# Patient Record
Sex: FEMALE | Race: WHITE
[De-identification: names, ages, dates, MRNs, and addresses within clinical notes are randomized per-mention and may not be internally consistent; named-entity substitution may affect disease eponyms.]

---

## 2018-02-20 ENCOUNTER — HOSPITAL ENCOUNTER (INPATIENT)
Dept: HOSPITAL 17 - NEPE | Age: 63
LOS: 8 days | Discharge: NURSING, REHAB OR LONG-TERM CARE | DRG: 29 | End: 2018-02-28
Attending: HOSPITALIST | Admitting: HOSPITALIST
Payer: COMMERCIAL

## 2018-02-20 VITALS — WEIGHT: 210.1 LBS | HEIGHT: 63 IN | BODY MASS INDEX: 37.23 KG/M2

## 2018-02-20 VITALS
OXYGEN SATURATION: 97 % | TEMPERATURE: 98 F | HEART RATE: 86 BPM | RESPIRATION RATE: 16 BRPM | DIASTOLIC BLOOD PRESSURE: 79 MMHG | SYSTOLIC BLOOD PRESSURE: 167 MMHG

## 2018-02-20 DIAGNOSIS — Z80.8: ICD-10-CM

## 2018-02-20 DIAGNOSIS — Z80.1: ICD-10-CM

## 2018-02-20 DIAGNOSIS — N39.498: ICD-10-CM

## 2018-02-20 DIAGNOSIS — Z82.49: ICD-10-CM

## 2018-02-20 DIAGNOSIS — G62.9: ICD-10-CM

## 2018-02-20 DIAGNOSIS — E66.9: ICD-10-CM

## 2018-02-20 DIAGNOSIS — G89.29: ICD-10-CM

## 2018-02-20 DIAGNOSIS — I10: ICD-10-CM

## 2018-02-20 DIAGNOSIS — G82.21: ICD-10-CM

## 2018-02-20 DIAGNOSIS — F17.210: ICD-10-CM

## 2018-02-20 DIAGNOSIS — K21.9: ICD-10-CM

## 2018-02-20 DIAGNOSIS — E11.65: ICD-10-CM

## 2018-02-20 DIAGNOSIS — Z23: ICD-10-CM

## 2018-02-20 DIAGNOSIS — Z91.81: ICD-10-CM

## 2018-02-20 DIAGNOSIS — M54.5: ICD-10-CM

## 2018-02-20 DIAGNOSIS — Z80.0: ICD-10-CM

## 2018-02-20 DIAGNOSIS — G95.20: ICD-10-CM

## 2018-02-20 DIAGNOSIS — J30.2: ICD-10-CM

## 2018-02-20 DIAGNOSIS — D35.02: ICD-10-CM

## 2018-02-20 DIAGNOSIS — D32.1: Primary | ICD-10-CM

## 2018-02-20 DIAGNOSIS — Z83.3: ICD-10-CM

## 2018-02-20 DIAGNOSIS — M19.90: ICD-10-CM

## 2018-02-20 PROCEDURE — 94150 VITAL CAPACITY TEST: CPT

## 2018-02-20 PROCEDURE — 72158 MRI LUMBAR SPINE W/O & W/DYE: CPT

## 2018-02-20 PROCEDURE — 72147 MRI CHEST SPINE W/DYE: CPT

## 2018-02-20 PROCEDURE — 80053 COMPREHEN METABOLIC PANEL: CPT

## 2018-02-20 PROCEDURE — 88307 TISSUE EXAM BY PATHOLOGIST: CPT

## 2018-02-20 PROCEDURE — 71260 CT THORAX DX C+: CPT

## 2018-02-20 PROCEDURE — 90686 IIV4 VACC NO PRSV 0.5 ML IM: CPT

## 2018-02-20 PROCEDURE — 72156 MRI NECK SPINE W/O & W/DYE: CPT

## 2018-02-20 PROCEDURE — 85027 COMPLETE CBC AUTOMATED: CPT

## 2018-02-20 PROCEDURE — 99285 EMERGENCY DEPT VISIT HI MDM: CPT

## 2018-02-20 PROCEDURE — 88311 DECALCIFY TISSUE: CPT

## 2018-02-20 PROCEDURE — 70450 CT HEAD/BRAIN W/O DYE: CPT

## 2018-02-20 PROCEDURE — 83735 ASSAY OF MAGNESIUM: CPT

## 2018-02-20 PROCEDURE — 86850 RBC ANTIBODY SCREEN: CPT

## 2018-02-20 PROCEDURE — 85025 COMPLETE CBC W/AUTO DIFF WBC: CPT

## 2018-02-20 PROCEDURE — 71045 X-RAY EXAM CHEST 1 VIEW: CPT

## 2018-02-20 PROCEDURE — 84484 ASSAY OF TROPONIN QUANT: CPT

## 2018-02-20 PROCEDURE — 93005 ELECTROCARDIOGRAM TRACING: CPT

## 2018-02-20 PROCEDURE — 86900 BLOOD TYPING SEROLOGIC ABO: CPT

## 2018-02-20 PROCEDURE — 86140 C-REACTIVE PROTEIN: CPT

## 2018-02-20 PROCEDURE — 82948 REAGENT STRIP/BLOOD GLUCOSE: CPT

## 2018-02-20 PROCEDURE — 72070 X-RAY EXAM THORAC SPINE 2VWS: CPT

## 2018-02-20 PROCEDURE — 70496 CT ANGIOGRAPHY HEAD: CPT

## 2018-02-20 PROCEDURE — 76000 FLUOROSCOPY <1 HR PHYS/QHP: CPT

## 2018-02-20 PROCEDURE — 87641 MR-STAPH DNA AMP PROBE: CPT

## 2018-02-20 PROCEDURE — 85730 THROMBOPLASTIN TIME PARTIAL: CPT

## 2018-02-20 PROCEDURE — 74177 CT ABD & PELVIS W/CONTRAST: CPT

## 2018-02-20 PROCEDURE — 70553 MRI BRAIN STEM W/O & W/DYE: CPT

## 2018-02-20 PROCEDURE — 83036 HEMOGLOBIN GLYCOSYLATED A1C: CPT

## 2018-02-20 PROCEDURE — 86901 BLOOD TYPING SEROLOGIC RH(D): CPT

## 2018-02-20 PROCEDURE — 82550 ASSAY OF CK (CPK): CPT

## 2018-02-20 PROCEDURE — 80048 BASIC METABOLIC PNL TOTAL CA: CPT

## 2018-02-20 PROCEDURE — 85610 PROTHROMBIN TIME: CPT

## 2018-02-20 PROCEDURE — 85652 RBC SED RATE AUTOMATED: CPT

## 2018-02-20 PROCEDURE — A9579 GAD-BASE MR CONTRAST NOS,1ML: HCPCS

## 2018-02-20 PROCEDURE — 81001 URINALYSIS AUTO W/SCOPE: CPT

## 2018-02-21 VITALS
SYSTOLIC BLOOD PRESSURE: 128 MMHG | OXYGEN SATURATION: 99 % | DIASTOLIC BLOOD PRESSURE: 83 MMHG | RESPIRATION RATE: 16 BRPM | HEART RATE: 81 BPM | TEMPERATURE: 97.9 F

## 2018-02-21 VITALS
HEART RATE: 73 BPM | SYSTOLIC BLOOD PRESSURE: 131 MMHG | DIASTOLIC BLOOD PRESSURE: 66 MMHG | RESPIRATION RATE: 18 BRPM | TEMPERATURE: 96.7 F | OXYGEN SATURATION: 96 %

## 2018-02-21 VITALS
DIASTOLIC BLOOD PRESSURE: 60 MMHG | HEART RATE: 71 BPM | RESPIRATION RATE: 16 BRPM | TEMPERATURE: 97.7 F | SYSTOLIC BLOOD PRESSURE: 132 MMHG | OXYGEN SATURATION: 98 %

## 2018-02-21 VITALS — DIASTOLIC BLOOD PRESSURE: 76 MMHG | TEMPERATURE: 97.8 F | SYSTOLIC BLOOD PRESSURE: 133 MMHG

## 2018-02-21 LAB
ALBUMIN SERPL-MCNC: 3.4 GM/DL (ref 3.4–5)
ALP SERPL-CCNC: 109 U/L (ref 45–117)
ALT SERPL-CCNC: 21 U/L (ref 10–53)
AST SERPL-CCNC: 19 U/L (ref 15–37)
BASOPHILS # BLD AUTO: 0.1 TH/MM3 (ref 0–0.2)
BASOPHILS NFR BLD: 0.6 % (ref 0–2)
BILIRUB SERPL-MCNC: 0.2 MG/DL (ref 0.2–1)
BUN SERPL-MCNC: 15 MG/DL (ref 7–18)
CALCIUM SERPL-MCNC: 8.8 MG/DL (ref 8.5–10.1)
CHLORIDE SERPL-SCNC: 106 MEQ/L (ref 98–107)
COLOR UR: YELLOW
CREAT SERPL-MCNC: 0.47 MG/DL (ref 0.5–1)
CRP SERPL-MCNC: (no result) MG/DL (ref 0–0.3)
EOSINOPHIL # BLD: 0.5 TH/MM3 (ref 0–0.4)
EOSINOPHIL NFR BLD: 4.1 % (ref 0–4)
ERYTHROCYTE [DISTWIDTH] IN BLOOD BY AUTOMATED COUNT: 14.2 % (ref 11.6–17.2)
GFR SERPLBLD BASED ON 1.73 SQ M-ARVRAT: 135 ML/MIN (ref 89–?)
GLUCOSE SERPL-MCNC: 192 MG/DL (ref 74–106)
GLUCOSE UR STRIP-MCNC: 300 MG/DL
HBA1C MFR BLD: 7.9 % (ref 4.3–6)
HCO3 BLD-SCNC: 25.6 MEQ/L (ref 21–32)
HCT VFR BLD CALC: 42.3 % (ref 35–46)
HGB BLD-MCNC: 14.1 GM/DL (ref 11.6–15.3)
HGB UR QL STRIP: (no result)
INR PPP: 1 RATIO
KETONES UR STRIP-MCNC: (no result) MG/DL
LYMPHOCYTES # BLD AUTO: 3.4 TH/MM3 (ref 1–4.8)
LYMPHOCYTES NFR BLD AUTO: 27.3 % (ref 9–44)
MCH RBC QN AUTO: 27.7 PG (ref 27–34)
MCHC RBC AUTO-ENTMCNC: 33.3 % (ref 32–36)
MCV RBC AUTO: 83.3 FL (ref 80–100)
MONOCYTE #: 1.2 TH/MM3 (ref 0–0.9)
MONOCYTES NFR BLD: 9.9 % (ref 0–8)
MUCOUS THREADS #/AREA URNS LPF: (no result) /LPF
NEUTROPHILS # BLD AUTO: 7.1 TH/MM3 (ref 1.8–7.7)
NEUTROPHILS NFR BLD AUTO: 58.1 % (ref 16–70)
NITRITE UR QL STRIP: (no result)
PLATELET # BLD: 167 TH/MM3 (ref 150–450)
PMV BLD AUTO: 9.8 FL (ref 7–11)
PROT SERPL-MCNC: 7.2 GM/DL (ref 6.4–8.2)
PROTHROMBIN TIME: 10.1 SEC (ref 9.8–11.6)
RBC # BLD AUTO: 5.08 MIL/MM3 (ref 4–5.3)
SODIUM SERPL-SCNC: 138 MEQ/L (ref 136–145)
SP GR UR STRIP: 1.01 (ref 1–1.03)
SQUAMOUS #/AREA URNS HPF: <1 /HPF (ref 0–5)
TROPONIN I SERPL-MCNC: (no result) NG/ML (ref 0.02–0.05)
URINE LEUKOCYTE ESTERASE: (no result)
WBC # BLD AUTO: 12.3 TH/MM3 (ref 4–11)

## 2018-02-21 RX ADMIN — DULOXETINE SCH MG: 60 CAPSULE, DELAYED RELEASE ORAL at 09:04

## 2018-02-21 RX ADMIN — INSULIN ASPART SCH: 100 INJECTION, SOLUTION INTRAVENOUS; SUBCUTANEOUS at 18:10

## 2018-02-21 RX ADMIN — PHENYTOIN SODIUM SCH MLS/HR: 50 INJECTION INTRAMUSCULAR; INTRAVENOUS at 18:13

## 2018-02-21 RX ADMIN — MAGNESIUM OXIDE TAB 400 MG (241.3 MG ELEMENTAL MG) SCH MG: 400 (241.3 MG) TAB at 09:04

## 2018-02-21 RX ADMIN — LISINOPRIL SCH MG: 20 TABLET ORAL at 09:04

## 2018-02-21 RX ADMIN — ENOXAPARIN SODIUM SCH MG: 30 INJECTION SUBCUTANEOUS at 21:21

## 2018-02-21 RX ADMIN — INSULIN ASPART SCH: 100 INJECTION, SOLUTION INTRAVENOUS; SUBCUTANEOUS at 12:15

## 2018-02-21 RX ADMIN — INSULIN ASPART SCH: 100 INJECTION, SOLUTION INTRAVENOUS; SUBCUTANEOUS at 22:30

## 2018-02-21 RX ADMIN — MAGNESIUM OXIDE TAB 400 MG (241.3 MG ELEMENTAL MG) SCH MG: 400 (241.3 MG) TAB at 21:13

## 2018-02-21 RX ADMIN — DEXAMETHASONE SCH MG: 4 TABLET ORAL at 21:14

## 2018-02-21 RX ADMIN — CETIRIZINE HYDROCHLORIDE SCH MG: 10 TABLET, FILM COATED ORAL at 09:03

## 2018-02-21 RX ADMIN — DOCUSATE SODIUM SCH MG: 100 CAPSULE, LIQUID FILLED ORAL at 09:04

## 2018-02-21 RX ADMIN — DEXAMETHASONE SCH MG: 4 TABLET ORAL at 21:28

## 2018-02-21 RX ADMIN — PANTOPRAZOLE SCH MG: 40 TABLET, DELAYED RELEASE ORAL at 09:05

## 2018-02-21 RX ADMIN — DOCUSATE SODIUM SCH MG: 100 CAPSULE, LIQUID FILLED ORAL at 21:14

## 2018-02-21 NOTE — RADRPT
EXAM DATE/TIME:  02/21/2018 18:23 

 

[facility] COMPARISON:     

MRI BRAIN  W & W/O CONTRAST, February 21, 2018, 13:15.

       

 

 

INDICATIONS :     

***Inability to ambulate.

                     

 

CONTRAST:     

18 cc Omniscan (gadodiamide) IV

                     

 

MEDICAL HISTORY :     

Hypertension. Gastroesophageal reflux disease.   

 

SURGICAL HISTORY :           

aneursym coiling

 

ENCOUNTER:     

Initial

 

ACUITY:     

2 day

 

PAIN SCORE:     

8/10

 

LOCATION:     

Bilateral   LE

 

TECHNIQUE:     

Multiplanar multisequence MRI of the thoracic spine was performed.

 

FINDINGS:     

 

Dedicated pre-and postcontrast imaging of the thoracic spine is provided. The examination demonstrate
s an enhancing lesion measuring 1.2 x 1.1 x 1.7 cm. On the postcontrast axial imaging. This appears t
o be located within the cord itself consistent with intramedullary/intradural lesion. Differential co
nsiderations would include metastatic disease to the cord, astrocytoma or hemangioblastoma.

 

The examination does demonstrate areas of signal abnormality in the T5 and posterior aspect of the T1
0 and T11 vertebral bodies. The area in the T5 vertebral body probably represents hemangioma. The les
ion identified in the posterior elements at T10 and T11 is atypical in appearance and could represent
 bony metastatic disease.

 

The paraspinous soft tissues are unremarkable by MRI.

 

 

 

CONCLUSION:     

1. The examination demonstrates an intramedullary, intradural lesion immediately posterior to the T9-
10 disc level. Differential considerations are given above. 

2. The dedicated imaging of the thoracic spine also demonstrates abnormal enhancement and abnormal si
gnal within the posterior elements of the T10 and T11 vertebral bodies concerning for bony metastatic
 disease.

 

 

 

 Ruddy Cheney MD on February 21, 2018 at 20:11           

Board Certified Radiologist.

 This report was verified electronically.

## 2018-02-21 NOTE — HHI.HP
HPI


Service


[facility]


Primary Care Physician


Dr. Debra Rashid


Admission Diagnosis





Weakness


Chief Complaint:  


LE progressive weakness


Travel History


International Travel<30 Days:  No


Contact w/Intl Traveler <30 Da:  No


Traveled to Known Affected Are:  No


History of Present Illness


Ms. Swenson is a pleasant 61 y/o WF with HTN, GERD, OA and hx of ruptured 

cerebral aneurysm in  s/p coiling procedure. She presented to the ED at [facility] [place] 

with progressive weakness in bilateral LE. 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 

floor. Within 1-2 weeks of this she noticed having some LLE weakness but was 

still about to function normally. In 10/2016 she developed bilateral LE pain 

and increased weakness in the LLE and increased fatigue. The bilateral LE pain 

progressed to nonpainful peripheral neuropathy of the bottoms of her feet over 

the following 6-8 weeks. In 2016 she lost her balance, which she attributed 

to the peripheral neuropathy and she fell fracturing her left humerus. In 

2017 she noted a decrease in the dorsiflexion of her left foot and the 

following month she had another fall injuring her left ankle and had increased 

peripheral neuropathy up to the mid calf bilaterally. She then began having 

increased difficulty with ambulation felt to be secondary to her injuries. She 

had two falls after this one of which caused a right fibular fracture which 

required casting and she was no longer able to bear weight and noted that she 

had hyperreflexia of bilateral LE. She had an outpt noncontrasted MRI of the L-

spine in 2017 which was reported as "no acute injury." She then began having 

increased difficulty moving bilateral LE, increased peripheral neuropathy to 

the knees and started having bladder incontinence. She began outpt PT twice a 

week. In 2017 she was started on Lyrica 150mg po BID and initially had 

improvement with increased LE sensation and she was able to bear weight with 

assistance. The Lyrica became too expensive and she was switched to Gabapentin 

800mg TID in 2017 but she was unable to function on the Gabapentin due to 

increased fatigue/sleepiness. In 2017 she restarted the Lyrica 150mg po 

BID and this was increased to TID dosing but over the last month and a half she 

has had worsening of her bilateral LE weakness. She is unable to bear weight 

and over the last two weeks she has not been able to move her LE actively and 

her bladder incontinence has been much worse. Pt denies any head injuries. No 

medication changes other than the use of the Lyrica and temporarily the 

Gabapentin. She denies any recent illnesses or recent travel. She denies any 

changes in weight or appetite. She does smoke about 1 ppd. Denies any SOB, cough

, chest pain, palpitations, dizziness, nausea/vomiting.





Review of Systems


Constitutional:  COMPLAINS OF: Fatigue, DENIES: Fever, Weight loss, Dizziness


Eyes:  DENIES: Vision loss


Ears, nose, mouth, throat:  DENIES: Hearing loss


Respiratory:  DENIES: Cough, Shortness of breath


Cardiovascular:  DENIES: Chest pain, Dyspnea on Exertion, Lower Extremity Edema


Gastrointestinal:  DENIES: Abdominal pain, Constipation, Nausea, Vomiting


Genitourinary:  COMPLAINS OF: Urinary incontinence


Musculoskeletal:  DENIES: Back pain, Neck pain


Integumentary:  DENIES: Rash


Neurologic:  COMPLAINS OF: Localized weakness, Paresthesias, Poor Balance, 

DENIES: Headache, Speech Problems


Psychiatric:  DENIES: Confusion, Depression





Past Family Social History


Past Medical History


Subarachnoid hemorrhage secondary to ruptured intracranial aneurysm in 


HTN


GERD


OA


Tobacco use


Seasonal allergies


Obesity


Past Surgical History


Coiling of anterior communicating artery aneurysm in 


Lasik eye surgery in 


[place] teeth removal


Reported Medications


Naprosyn (Naproxen) 500 Mg Tab 500 Mg PO Q12HR PRN


Cetirizine (Cetirizine HCl) 10 Mg Tab 10 Mg  DAILY


Lyrica (Pregabalin) 150 Mg Cap 150 Mg PO TID


Cymbalta DR (Duloxetine HCl) 60 Mg Capdr 60 Mg PO DAILY


Aspirin 81 Mg Chew 81 Mg CHEW DAILY


Calcium 600 with Vitamin D (Calcium Carbonate-Cholecalciferol) 600-400 mg-Unit 

Tab 1 Tab PO DAILY


B12 (Cyanocobalamin) 1,000 Mcg Tab   


Multi-Vitamin Daily (Multiple Vitamin) 1 Tab Tab 1 Tab PO DAILY


Glucosamine (Glucosamine Sulfate) 1,000 Mg Cap 1,000 Mg  DAILY


Omega-3 Fish Oil/Vitamin (Fish Oil-Cholecalciferol) 1,000-1,000 Mg Cap 1 Cap PO 

DAILY


Magnesium Oxide 400 Mg Tab 400 Mg PO BID


Vitamin C (Ascorbic Acid) 250 Mg Chew 500 Mg CHEW DAILY


Colace (Docusate Sodium) 100 Mg Capsule 100 Mg PO BID


Omeprazole 40 Mg Cap 40 Mg  DAILY


Lisinopril 40 Mg Tab 40 Mg PO DAILY


Norvasc (Amlodipine Besylate) 10 Mg Tab 10 Mg PO DAILY


Allergies:  


Coded Allergies:  


     telmisartan (Unverified  Allergy, Severe, 18)


 thrombocytopenia


     doxycycline (Unverified  Allergy, Mild, NAUSEA AND VOMITING, 18)


     minocycline (Unverified  Allergy, Mild, NAUSEA AND VOMITING, 18)


     tigecycline (Unverified  Allergy, Mild, NAUSEA AND VOMITING, 18)


Family History


Father  from lung cancer (small cell) @ 59 y/o, also with COPD, tobacco 

use, CAD, HTN, obesity


Mother  at age 84 with Alzheimer's dementia, COPD, tobacco use, 

metastatic melanoma from RLE to lymph nodes


Brother living @ 69 t/o with CAD with MI at age 59, HTN, Hyperlipidemia, DM


Brother living @ 65 y/o healthy


Social History


(+) Tobacco use, smoked 1ppd since age 17


Occasional alcohol use, 1 glass of red wine 2-3 times per week


Denies any illicit drug use


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 


Pt is unmarried, no children





Physical Exam


Vital Signs





Vital Signs








  Date Time  Temp Pulse Resp B/P (MAP) Pulse Ox O2 Delivery O2 Flow Rate FiO2


 


18 22:24 98.0 86 16 167/79 (108) 97   








Physical Exam


GENERAL: This is a well-nourished, well-developed patient, in no apparent 

distress.


SKIN: No rashes, ecchymoses or lesions. Cool and dry.


HEENT: Atraumatic. Normocephalic. No temporal or scalp tenderness. No scleral 

icterus. Airway patent.


NECK: Trachea midline, supple, nontender.


CARDIO: Regular. 


RESP: CTA bilaterally. No wheezes, rales, or rhonchi.  


ABD: +BS, soft, non-tender, nondistended. 


EXT: Extremities without clubbing, cyanosis, or edema. No calf tenderness. 


NEURO: Awake and alert. Cranial nerves II through XII intact. Five out of 5 

muscle strength in bilateral UE. Pt unable to actively move LE. 


 Brisk reflexes on bilateral LE. Clonus bilateral LE.  Normal speech.


Laboratory





Laboratory Tests








Test


  18


05:03 18


06:41


 


White Blood Count 12.3  


 


Red Blood Count 5.08  


 


Hemoglobin 14.1  


 


Hematocrit 42.3  


 


Mean Corpuscular Volume 83.3  


 


Mean Corpuscular Hemoglobin 27.7  


 


Mean Corpuscular Hemoglobin


Concent 33.3 


  


 


 


Red Cell Distribution Width 14.2  


 


Platelet Count 167  


 


Mean Platelet Volume 9.8  


 


Neutrophils (%) (Auto) 58.1  


 


Lymphocytes (%) (Auto) 27.3  


 


Monocytes (%) (Auto) 9.9  


 


Eosinophils (%) (Auto) 4.1  


 


Basophils (%) (Auto) 0.6  


 


Neutrophils # (Auto) 7.1  


 


Lymphocytes # (Auto) 3.4  


 


Monocytes # (Auto) 1.2  


 


Eosinophils # (Auto) 0.5  


 


Basophils # (Auto) 0.1  


 


CBC Comment DIFF FINAL  


 


Differential Comment   


 


Erythrocyte Sedimentation Rate 27  


 


Blood Urea Nitrogen 15  


 


Creatinine 0.47  


 


Random Glucose 192  


 


Total Protein 7.2  


 


Albumin 3.4  


 


Calcium Level 8.8  


 


Alkaline Phosphatase 109  


 


Aspartate Amino Transf


(AST/SGOT) 19 


  


 


 


Alanine Aminotransferase


(ALT/SGPT) 21 


  


 


 


Total Bilirubin 0.2  


 


Sodium Level 138  


 


Potassium Level 4.2  


 


Chloride Level 106  


 


Carbon Dioxide Level 25.6  


 


Anion Gap 6  


 


Estimat Glomerular Filtration


Rate 135 


  


 


 


Total Creatine Kinase 53  


 


Troponin I LESS THAN 0.02  


 


C-Reactive Protein LESS THAN 0.29  


 


Urine Color  YELLOW 


 


Urine Turbidity  CLEAR 


 


Urine pH  6.0 


 


Urine Specific Gravity  1.014 


 


Urine Protein  NEG 


 


Urine Glucose (UA)  300 


 


Urine Ketones  NEG 


 


Urine Occult Blood  NEG 


 


Urine Nitrite  NEG 


 


Urine Bilirubin  NEG 


 


Urine Urobilinogen  LESS THAN 2.0 


 


Urine Leukocyte Esterase  NEG 


 


Urine RBC  1 


 


Urine WBC  1 


 


Urine Squamous Epithelial


Cells 


  <1 


 


 


Urine Mucus  FEW 


 


Microscopic Urinalysis Comment


  


  CATH-CULT NOT


IND








Result Diagram:  


18 0503                                                                   

             18 0503





Imaging





Last Impressions








Head CT 18 0000 Signed





Impressions: 





 Service Date/Time:  2018 06:46 - CONCLUSION: Stable 





 appearance with no evidence of hemorrhage or mass effect.     Ambrosio Mclean MD 











Caprini VTE Risk Assessment


Caprini VTE Risk Assessment:  Mod/High Risk (score >= 2)


Caprini Risk Assessment Model











 Point Value = 1          Point Value = 2  Point Value = 3  Point Value = 5


 


Age 41-60


Minor surgery


BMI > 25 kg/m2


Swollen legs


Varicose veins


Pregnancy or postpartum


History of unexplained or recurrent


   spontaneous 


Oral contraceptives or hormone


   replacement


Sepsis (< 1 month)


Serious lung disease, including


   pneumonia (< 1 month)


Abnormal pulmonary function


Acute myocardial infarction


Congestive heart failure (< 1 month)


History of inflammatory bowel disease


Medical patient at bed rest Age 61-74


Arthroscopic surgery


Major open surgery (> 45 min)


Laparoscopic surgery (> 45 min)


Malignancy


Confined to bed (> 72 hours)


Immobilizing plaster cast


Central venous access Age >= 75


History of VTE


Family history of VTE


Factor V Leiden


Prothrombin 38047P


Lupus anticoagulant


Anticardiolipin antibodies


Elevated serum homocysteine


Heparin-induced thrombocytopenia


Other congenital or acquired


   thrombophilia Stroke (< 1 month)


Elective arthroplasty


Hip, pelvis, or leg fracture


Acute spinal cord injury (< 1 month)








Prophylaxis Regimen











   Total Risk


Factor Score Risk Level Prophylaxis Regimen


 


0-1      Low Early ambulation


 


2 Moderate Order ONE of the following:


*Sequential Compression Device (SCD)


*Heparin 5000 units SQ BID


 


3-4 Higher Order ONE of the following medications:


*Heparin 5000 units SQ TID


*Enoxaparin/Lovenox 40 mg SQ daily (WT < 150 kg, CrCl > 30 mL/min)


*Enoxaparin/Lovenox 30 mg SQ daily (WT < 150 kg, CrCl > 10-29 mL/min)


*Enoxaparin/Lovenox 30 mg SQ BID (WT < 150 kg, CrCl > 30 mL/min)


AND/OR


*Sequential Compression Device (SCD)


 


5 or more Highest Order ONE of the following medications:


*Heparin 5000 units SQ TID (Preferred with Epidurals)


*Enoxaparin/Lovenox 40 mg SQ daily (WT < 150 kg, CrCl > 30 mL/min)


*Enoxaparin/Lovenox 30 mg SQ daily (WT < 150 kg, CrCl > 10-29 mL/min)


*Enoxaparin/Lovenox 30 mg SQ BID (WT < 150 kg, CrCl > 30 mL/min)


AND


*Sequential Compression Device (SCD)











Assessment and Plan


Problem List:  


(1) Leg weakness, bilateral


ICD Codes:  R29.898 - Other symptoms and signs involving the musculoskeletal 

system


Plan:  


Progressive LE weakness


Hyperreflexive LE


Bladder incontinence


Tobacco use


   - Pt is a 61 y/o WF with HTN, GERD, OA and hx of ruptured cerebral aneurysm 

in  s/p coiling procedure. 


   - She presented to the ED at [facility] [place] with progressive weakness in bilateral LE. 

See HPI for details of sequence of events


   - In 2017 she restarted the Lyrica 150mg po BID and this was 

increased to TID dosing but over the last month


    and a half she has had worsening of her bilateral LE weakness. She is 

unable to bear weight and over the last two weeks


    she has not been able to move her LE actively and her bladder incontinence 

has been much worse. 


   - Head CT () --> Stable appearance with no evidence of hemorrhage or 

mass effect. 


   - ESR is 27, CRP is less than 0.29


   - Its unclear if this could be related to progressive neurological disorder/

demyelinating disease vs. nerve impingement/compression


    vs. mass lesion vs. other. 


   - Check CXR as pt is long time smoker


   - Check MRI Brain/C-spine/T-spine/M-spine W&W/O contrast


   - Consider Neurology consultation pending above results


   - PT evaluation in AM


   - Supportive care





Hyperglycemia


   - Pt is not diagnosed as diabetic but notes that BS has been elevated


   - Check Hgb A1C


   - BS was 192 at admission and urinalysis with 300mg/dL of glucose noted


   - NovoLog SSI for now





HTN


   - Resume home meds


   - Monitor





GERD


   - PPI





Hx of ruptured intracranial aneurysm in  s/p coiling





(2) Bladder incontinence


ICD Codes:  R32 - Unspecified urinary incontinence


(3) Tobacco abuse


ICD Codes:  Z72.0 - Tobacco use


(4) HTN (hypertension)


ICD Codes:  I10 - Essential (primary) hypertension


(5) GERD (gastroesophageal reflux disease)


ICD Codes:  K21.9 - Gastro-esophageal reflux disease without esophagitis





Physician Certification


2 Midnight Certification Type:  Admission for Inpatient Services


Order for Inpatient Services


The services are ordered in accordance with Medicare regulations or non-

Medicare payer requirements, as applicable.  In the case of services not 

specified as inpatient-only, they are appropriately provided as inpatient 

services in accordance with the 2-midnight benchmark.


Estimated LOS (days):  3


3 days is the estimated time the patient will need to remain in the hospital, 

assuming treatment plan goals are met and no additional complications.


Post-Hospital Plan:  Not yet determined











Mavis Pena 2018 07:43

## 2018-02-21 NOTE — EKG
Date Performed: 02/21/2018       Time Performed: 04:58:13

 

PTAGE:      62 years

 

EKG:      Sinus rhythm 

 

 WITH OCCASIONAL SUPRAVENTRICULAR PREMATURE COMPLEXES LOW QRS VOLTAGE IN PRECORDIAL LEADS BORDERLINE 
ECG

 

PREVIOUS TRACING       : 12/03/2008 02.41

 

DOCTOR:   Rush Qiu  Interpretating Date/Time  02/21/2018 07:50:45

## 2018-02-21 NOTE — RADRPT
EXAM DATE/TIME:  02/21/2018 13:15 

 

[facility] COMPARISON:     

MRI BRAIN  W & W/O CONTRAST, February 21, 2018, 13:15.  CHEST SINGLE AP, February 21, 2018, 8:42.

       

 

 

INDICATIONS :     

***Progressive polyneuropathy bilateral lower extremity.

                     

 

CONTRAST:     

18 cc Omniscan (gadodiamide) IV

                     

 

MEDICAL HISTORY :     

Hypertension. Gastroesophageal reflux disease.   

 

SURGICAL HISTORY :           

cerebral coiling

 

ENCOUNTER:     

Initial

 

ACUITY:     

2 day

 

PAIN SCORE:     

8/10

 

LOCATION:         

lower leg.

 

TECHNIQUE:     

Screening MRI of the entire spinal axis was performed in the sagittal and axial planes.

 

FINDINGS:     

Axial and sagittal T1, T2 and postcontrast T1 weighted images demonstrate a homogeneously enhancing m
ass centered at the T9/10 disc level. The lesion measures 1.6 x 1.1 x 1.2 cm. I cannot define the cor
d separate from the mass and as such, this is considered an intradural, intramedullary lesion. This a
ppears quite centrally located with homogeneous enhancement and may represent an ependymoma however, 
there is no evidence of syrinx above this or hemorrhage. Other considerations would include astrocyto
ma or metastatic disease to the cord. Considerations felt less likely would be hemangioblastoma no fl
ow voids or calcification are seen within this.. There is no evidence of flow void within this. No cy
stic component is identified.

 

Imaging through the remainder of the cervical thoracic and lumbar spine is provided. These demonstrat
e degenerative changes within the cervical spine with broad-based disc bulges and osteophytic ridging
 at C2/3, 3/4 and C4/5. These abut the ventral thecal sac and result in mild flattening of the cord a
t these levels. The thecal space throughout the thoracic spine is adequate. There are mild degenerati
ve changes in the lumbar spine with small disc bulges and facet arthritis. There is no significant ne
ural foraminal stenosis or spinal stenosis evident.

 

CONCLUSION:     

1. The examination demonstrates a 1.6 x 1.1 x 1.2 cm homogeneously enhancing mass which appears to be
 an intradural intramedullary lesion. Considerations would include ependymoma, astrocytoma, metastati
c disease to the cord or less likely consideration such as hemangioblastoma.

2. Note is made of a hemangioma involving the T5 and T10 vertebral bodies.

 

 

 

 Ruddy Cheney MD on February 21, 2018 at 14:55           

Board Certified Radiologist.

 This report was verified electronically.

## 2018-02-21 NOTE — PD
HPI


.


Numbness/tingling


Chief Complaint:  Numbness/Tingling


Time Seen by Provider:  04:19


Travel History


International Travel<30 days:  No


Contact w/Intl Traveler<30days:  No


Traveled to known affect area:  No





History of Present Illness


HPI


62-year-old female complains of diffuse weakness numbness and tingling over the 

past year to year and a half, now patient is unable to walk or perform normal 

tasks of daily living.  Patient's caregiver to date has been her friend who is 

a nurse practitioner, who states that she could just not care for her friend 

any longer and that is unsafe in the home secondary to the patient's profound 

bilateral lower extremity weakness.  Patient has not had follow-up for same 

secondary to insurance issues.  Patient is health insurance is just reinstated.





PFSH


Past Medical History


*** Narrative Medical


Past medical history reviewed


Arthritis:  Yes (osteoarthritis)


Diminished Hearing:  No


GERD:  Yes


Hypertension:  Yes


Neurologic:  Yes (cerberal aneruisum)


Tetanus Vaccination:  < 5 Years


Influenza Vaccination:  No


Pregnant?:  Not Pregnant


LMP:  menapause





Past Surgical History


Eye Surgery:  Yes (lasix b/l eyes)


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


Oral Surgery:  Yes (wisdom)


Tympanostomy Tube:  Yes


Other Surgery:  Yes (WISDOM)





Social History


Alcohol Use:  Yes (2 or 3 times a week)


Tobacco Use:  Yes (1 PPD)


Substance Use:  No





Allergies-Medications


(Allergen,Severity, Reaction):  


Coded Allergies:  


     telmisartan (Unverified  Allergy, Severe, 2/20/18)


 thrombocytopenia


     doxycycline (Unverified  Allergy, Mild, NAUSEA AND VOMITING, 2/20/18)


     minocycline (Unverified  Allergy, Mild, NAUSEA AND VOMITING, 2/20/18)


     tigecycline (Unverified  Allergy, Mild, NAUSEA AND VOMITING, 2/20/18)


Reported Meds & Prescriptions





Reported Meds & Active Scripts


Active


Naprosyn (Naproxen) 500 Mg Tab 500 Mg PO Q12HR PRN


Reported


Cetirizine (Cetirizine HCl) 10 Mg Tab 10 Mg  DAILY


Lyrica (Pregabalin) 150 Mg Cap 150 Mg PO TID


Cymbalta DR (Duloxetine HCl) 60 Mg Capdr 60 Mg PO DAILY


Aspirin 81 Mg Chew 81 Mg CHEW DAILY


Calcium 600 with Vitamin D (Calcium Carbonate-Cholecalciferol) 600-400 mg-Unit 

Tab 1 Tab PO DAILY


B12 (Cyanocobalamin) 1,000 Mcg Tab   


Multi-Vitamin Daily (Multiple Vitamin) 1 Tab Tab 1 Tab PO DAILY


Glucosamine (Glucosamine Sulfate) 1,000 Mg Cap 1,000 Mg  DAILY


Omega-3 Fish Oil/Vitamin (Fish Oil-Cholecalciferol) 1,000-1,000 Mg Cap 1 Cap PO 

DAILY


Magnesium Oxide 400 Mg Tab 400 Mg PO BID


Vitamin C (Ascorbic Acid) 250 Mg Chew 500 Mg CHEW DAILY


Colace (Docusate Sodium) 100 Mg Capsule 100 Mg PO BID


Omeprazole 40 Mg Cap 40 Mg  DAILY


Lisinopril 40 Mg Tab 40 Mg PO DAILY


Norvasc (Amlodipine Besylate) 10 Mg Tab 10 Mg PO DAILY





Narrative Medication


Allergies and medications reviewed





Review of Systems


Except as stated in HPI:  all other systems reviewed are Neg


General / Constitutional:  No: Fever


Eyes:  No: Visual changes


HENT:  No: Headaches


Cardiovascular:  No: Chest Pain or Discomfort


Respiratory:  Positive: Cough, Shortness of Breath, Orthopnea, No: Hemoptysis, 

Stridor, Night Sweats, Pleuritic Pain


Gastrointestinal:  No: Abdominal Pain


Genitourinary:  No: Dysuria


Musculoskeletal:  No: Pain


Skin:  No Rash


Neurologic:  No: Weakness


Psychiatric:  No: Depression


Endocrine:  No: Polydipsia


Hematologic/Lymphatic:  No: Easy Bruising





Physical Exam


Narrative


GENERAL: Awake alert oriented 3 no acute distress.  Patient is showing 

generalized weakness, unable to stand up from a high sitting position.


SKIN: Warm and dry.  Color is normal diaphoresis sinus pallor


HEAD: Atraumatic. Normocephalic. 


EYES: Pupils equal and round. No scleral icterus. No injection or drainage. 


ENT: No nasal bleeding or discharge.  Mucous membranes pink and moist.


NECK: Trachea midline. No JVD.  Supple nontender full range of motion


CARDIOVASCULAR: Regular rate and rhythm.  S1-S2 no murmurs rubs or gallops


RESPIRATORY: No accessory muscle use. Clear to auscultation. Breath sounds 

equal bilaterally. 


GASTROINTESTINAL: Abdomen soft, non-tender, nondistended. Hepatic and splenic 

margins not palpable. 


MUSCULOSKELETAL: Extremities without clubbing, cyanosis, or edema. No obvious 

deformities. 


NEUROLOGICAL: Awake and alert. No obvious cranial nerve deficits.  Motor 

grossly within normal limits. Five out of 5 muscle strength in the arms and 

legs.  Normal speech.


PSYCHIATRIC: Appropriate mood and affect; insight and judgment normal.





Data


Data


Last Documented VS





Vital Signs








  Date Time  Temp Pulse Resp B/P (MAP) Pulse Ox O2 Delivery O2 Flow Rate FiO2


 


2/20/18 22:24 98.0 86 16 167/79 (108) 97   








Orders





 Orders


Electrocardiogram (2/21/18 04:19)


Complete Blood Count With Diff (2/21/18 04:19)


Comprehensive Metabolic Panel (2/21/18 04:19)


Creatine Kinase (Cpk) (2/21/18 04:19)


Troponin I (2/21/18 04:19)


Urinalysis - C+S If Indicated (2/21/18 04:19)


Ecg Monitoring (2/21/18 04:19)


Iv Access Insert/Monitor (2/21/18 04:19)


Oximetry (2/21/18 04:19)


Sodium Chloride 0.9% Flush (Ns Flush) (2/21/18 04:30)


C-Reactive Protein (Crp) (2/21/18 04:19)


Westergren Sedimentation Rate (2/21/18 04:19)


Ct Brain W/O Iv Contrast(Rout) (2/21/18 )


Admit Order (Ed Use Only) (2/21/18 07:25)





Labs





Laboratory Tests








Test


  2/21/18


05:03 2/21/18


06:41


 


White Blood Count 12.3 TH/MM3  


 


Red Blood Count 5.08 MIL/MM3  


 


Hemoglobin 14.1 GM/DL  


 


Hematocrit 42.3 %  


 


Mean Corpuscular Volume 83.3 FL  


 


Mean Corpuscular Hemoglobin 27.7 PG  


 


Mean Corpuscular Hemoglobin


Concent 33.3 % 


  


 


 


Red Cell Distribution Width 14.2 %  


 


Platelet Count 167 TH/MM3  


 


Mean Platelet Volume 9.8 FL  


 


Neutrophils (%) (Auto) 58.1 %  


 


Lymphocytes (%) (Auto) 27.3 %  


 


Monocytes (%) (Auto) 9.9 %  


 


Eosinophils (%) (Auto) 4.1 %  


 


Basophils (%) (Auto) 0.6 %  


 


Neutrophils # (Auto) 7.1 TH/MM3  


 


Lymphocytes # (Auto) 3.4 TH/MM3  


 


Monocytes # (Auto) 1.2 TH/MM3  


 


Eosinophils # (Auto) 0.5 TH/MM3  


 


Basophils # (Auto) 0.1 TH/MM3  


 


CBC Comment DIFF FINAL  


 


Differential Comment   


 


Erythrocyte Sedimentation Rate 27 mm/hr  


 


Blood Urea Nitrogen 15 MG/DL  


 


Creatinine 0.47 MG/DL  


 


Random Glucose 192 MG/DL  


 


Total Protein 7.2 GM/DL  


 


Albumin 3.4 GM/DL  


 


Calcium Level 8.8 MG/DL  


 


Alkaline Phosphatase 109 U/L  


 


Aspartate Amino Transf


(AST/SGOT) 19 U/L 


  


 


 


Alanine Aminotransferase


(ALT/SGPT) 21 U/L 


  


 


 


Total Bilirubin 0.2 MG/DL  


 


Sodium Level 138 MEQ/L  


 


Potassium Level 4.2 MEQ/L  


 


Chloride Level 106 MEQ/L  


 


Carbon Dioxide Level 25.6 MEQ/L  


 


Anion Gap 6 MEQ/L  


 


Estimat Glomerular Filtration


Rate 135 ML/MIN 


  


 


 


Total Creatine Kinase 53 U/L  


 


Troponin I


  LESS THAN 0.02


NG/ML 


 


 


C-Reactive Protein


  LESS THAN 0.29


MG/DL 


 


 


Urine Color  YELLOW 


 


Urine Turbidity  CLEAR 


 


Urine pH  6.0 


 


Urine Specific Gravity  1.014 


 


Urine Protein  NEG mg/dL 


 


Urine Glucose (UA)  300 mg/dL 


 


Urine Ketones  NEG mg/dL 


 


Urine Occult Blood  NEG 


 


Urine Nitrite  NEG 


 


Urine Bilirubin  NEG 


 


Urine Urobilinogen


  


  LESS THAN 2.0


MG/DL


 


Urine Leukocyte Esterase  NEG 


 


Urine RBC  1 /hpf 


 


Urine WBC  1 /hpf 


 


Urine Squamous Epithelial


Cells 


  <1 /hpf 


 


 


Urine Mucus  FEW /lpf 


 


Microscopic Urinalysis Comment


  


  CATH-CULT NOT


IND











MDM


Medical Decision Making


Medical Screen Exam Complete:  Yes


Emergency Medical Condition:  Yes


Medical Record Reviewed:  Yes


Differential Diagnosis


Generalized weakness, demyelinating disorder, prefrontal lower extremity 

weakness, malingering


Narrative Course


Laboratory examinations reviewed, no significant rales with the exception of a 

white blood cell count elevated 12.3 of uncertain correlation.  Patient 

urinalysis negative, sedimentation rate normal, CRP normal.  CT head normal


Case discussed with Dr. Reyes from [facility], patient admitted 

for evaluation and neurology consultation.  MRI of the brain and entire spine 

discussed





Diagnosis





 Primary Impression:  


 Weakness





Admitting Information


Admitting Physician Requests:  Admit











Robles Shelley MD Feb 21, 2018 06:16

## 2018-02-21 NOTE — RADRPT
EXAM DATE/TIME:  02/21/2018 13:15 

 

[facility] COMPARISON:     

CT BRAIN W/O CONTRAST, December 26, 2016, 18:14.  CT BRAIN W/O CONTRAST, February 21, 2018, 6:46.

       

 

 

INDICATIONS :     

***Progressive polyneuropathy bilateral lower extremity.

                     

 

CONTRAST:     

18 cc Omniscan (gadodiamide) IV

                     

 

MEDICAL HISTORY :     

Hypertension. Gastroesophageal reflux disease.   

 

SURGICAL HISTORY :           

cerebral coiling

 

ENCOUNTER:     

Initial

 

ACUITY:     

2 weeks

 

PAIN SCORE:     

0/10

 

LOCATION:         

brain

 

TECHNIQUE:     

Multiplanar, multisequence MRI of the brain was performed both prior to and following the administrat
ion of paramagnetic contrast.

 

FINDINGS:     

 

CEREBRUM:     

The ventricles are normal for age.  No evidence of midline shift, mass lesion, hemorrhage or acute in
farction.  No extraaxial fluid collections are seen.  The pituitary gland and suprasellar cistern are
 normal in configuration.  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

 

WHITE MATTER:     

No significant signal abnormalities are seen in the white matter.

 

POSTERIOR FOSSA:     

The cerebellum and brainstem are intact.  The 4th ventricle is midline. The cerebellopontine angle is
 unremarkable.  The cerebellar tonsils are normal in position.

 

DIFFUSION IMAGING:     

No focal areas of restricted diffusion are seen.  No evidence of acute infarction.

 

EXTRACRANIAL:     

The visualized portions of the orbits and paranasal sinuses are unremarkable.

 

POST-CONTRAST:     

No abnormal areas of parenchymal or dural enhancement.  No evidence of blood-brain barrier breakdown.


 

CONCLUSION:     

Negative MRI of the brain with and without contrast.

 

 

 

 Dav Juan MD on February 21, 2018 at 14:18           

Board Certified Radiologist.

 This report was verified electronically.

## 2018-02-21 NOTE — RADRPT
EXAM DATE/TIME:  02/21/2018 08:42 

 

[facility] COMPARISON:     

No previous studies available for comparison.

 

                     

INDICATIONS :     

General weakness. 

                     

 

MEDICAL HISTORY :     

Hypertension.          

 

SURGICAL HISTORY :     

None.   

 

ENCOUNTER:     

Initial                                        

 

ACUITY:     

4 - 6  months      

 

PAIN SCORE:     

0/10

 

LOCATION:     

Bilateral chest 

 

FINDINGS:     

There is an ill-defined infiltrate in the right infrahilar region with some air bronchograms.  The le
ft lung is clear.  The heart is normal size.  Both hemidiaphragms are well delineated.

 

CONCLUSION:     

Small right infrahilar infiltrate.

 

 

 

 Dav Juan MD on February 21, 2018 at 9:15           

Board Certified Radiologist.

 This report was verified electronically.

## 2018-02-21 NOTE — RADRPT
EXAM DATE/TIME:  02/21/2018 06:46 

 

[facility] COMPARISON:     

CT BRAIN W/O CONTRAST, December 26, 2016, 18:14.

 

 

INDICATIONS :     

Inability to ambulate. 

                      

 

RADIATION DOSE:     

56.35 CTDIvol (mGy) 

 

 

 

MEDICAL HISTORY :     

Hypertension.  Cerebral aneurysm

 

SURGICAL HISTORY :       

Tympanostomy tubes, aneurysm clips

 

ENCOUNTER:      

Initial

 

ACUITY:      

>1 yr

 

PAIN SCALE:      

0/10

 

LOCATION:        

cranial 

 

TECHNIQUE:     

Multiple contiguous axial images were obtained of the head.  Using automated exposure control and adj
ustment of the mA and/or kV according to patient size, radiation dose was kept as low as reasonably a
chievable to obtain optimal diagnostic quality images.   DICOM format image data is available electro
nically for review and comparison.  

 

FINDINGS:     

 

CEREBRUM:     

The ventricles are normal for age.  No evidence of midline shift, mass lesion, hemorrhage or acute in
farction.  No extra-axial fluid collections are seen. An aneurysm clip is again noted in the suprasel
lar region with streak artifact.

 

POSTERIOR FOSSA:     

The cerebellum and brainstem are intact.  The 4th ventricle is midline.  The cerebellopontine angle i
s unremarkable.

 

EXTRACRANIAL:     

The visualized portion of the orbits is intact.

 

SKULL:     

The calvaria is intact.  No evidence of skull fracture.

 

CONCLUSION:     Stable appearance with no evidence of hemorrhage or mass effect. 

 

 

 Ambrosio Mclean MD on February 21, 2018 at 6:53           

Board Certified Radiologist.

 This report was verified electronically.

## 2018-02-21 NOTE — MB
cc:

MATT MA M.D.

****

 

 

DATE OF CONSULTATION:  02/21/2018.

 

REASON FOR CONSULTATION:

Paraplegia

 

HISTORY OF PRESENT ILLNESS:

This is a 62-year-old obese  female who presented to the emergency

room today with complaints of inability to move her legs along with urinary

incontinence which is chronic.  Her symptoms started over two years ago in

September of 2016 when she started noticing weakness in both her legs along

with some numbness which has progressed to complete paraplegia and urinary

incontinence since May of 2017.  She has been using a hospital bed and

wheelchair to get around.  She relates that she has not sought any medical

attention because of lack of health insurance She also relates a chronic

history of low back pain.  Denies any neck or upper extremity symptoms.  She

also has a history of a subarachnoid hemorrhage with ruptured cerebral anterior

communicating artery aneurysm in 2008 and was treated with endovascular

coiling.  She denies any headaches or nausea or vomiting.  Workup included a CT

scan of the head today which was negative for any acute findings.  The coil is

in place in the anterior communicating artery area.  She also had a subsequent

brain MRI scan which was negative with and without contrast for any

intracranial abnormality.  She has had an entire spine screening MRI scan which

reveals intradural and the radiologist feels an intramedullary mass around the

T9-10 that involves pretty much the whole spinal canal. In the sagittal view,

there is an enhancing portion, although on the actual view, I am not clear

whether an enhanced study was done and no thin sections through this mass

appeared.

 

PAST MEDICAL HISTORY:

1. Hypertension.

2. Cerebral aneurysm rupture in 2008 status post coiling.

3. Osteoarthritis.

4. Gastroesophageal reflux.

5. Diabetes mellitus.

6. Seasonal allergies.

7. LASIK eye surgery in 2001.

8. History of frequent falls with left humerus fracture in December of 2016 and

   right fibular fracture in May of 2017.

 

 

 

 

 

MEDICATIONS:

Her medications include:

1. Norvasc 10 milligrams daily.

2. Vitamin C 500 milligrams daily.

3. Aspirin 81 milligrams daily.

4. Calcium one tablet daily.

5. Cotrimazine 10 milligrams daily.

6. Vitamin B12 1000 micrograms daily.

7. Colace 100 milligrams twice a day.

8. Cymbalta 60 milligrams daily.

9. Fish oil.

10. Glucosamine.

11. Lisinopril 40 milligrams daily.

12. Magnesium oxide 400 milligrams twice a day.

13. A multivitamin.

14. Naproxen 500 milligrams q. 12 hours PRN.

15. Omeprazole 40 milligrams daily.

16. Lyrica 150 milligrams three times a day.

 

ALLERGIES:

1. DOXYCYCLINE.

2. _____ cycline.

3. TELMISARTAN.

4. TIGECYCLINE.

 

SOCIAL HISTORY:

She is single.  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

practitioner. She smokes a pack a day of cigarettes.  Drinks alcohol on social

basis. usually two glass of wine a week.

 

REVIEW OF SYSTEMS:

Denies any headaches.  No double vision or blurred vision.  No nausea,

vomiting, no neck pain and no upper extremity numbness or weakness. No chest

pain or shortness of breath.  No hemoptysis or bloody stools.  She has had some

diarrhea now and then. She has had urinary incontinence for over six months.

She has paraplegia in the lower extremities for past eight months, although

weakness started over two years ago in the lower extremities.  She also noted

decreased sensation in the lower extremities.  Denies any abdominal pain.

Denies any history of easy bleeding or bruising.  No fevers or chills.

Otherwise the rest of the review of systems is negative with pertinent

positives as mentioned in history present illness.

 

 

 

FAMILY HISTORY:

Father had lung cancer.  Mother had metastatic melanoma. She has two brothers

and one has diabetes and coronary artery disease.

 

LABORATORY FINDINGS:

White blood cell count 12.3, hemoglobin 14.1, platelet count 167,000.

 

Sodium 138, potassium 4.2, BUN 15, creatinine 0.47, glucose 192.

 

PHYSICAL EXAMINATION:

VITAL SIGNS:  Temperature 97.8, pulse is 68, rest rate 16, blood pressure

133/76, oxygen saturations 98% on room air.

GENERAL:  This is an elderly obese female lying in bed in no acute distress.

HEAD, EYES, EARS, NOSE, THROAT:  Head is normocephalic, atraumatic with no

Ryan or raccoon signs.

NECK: Neck is supple.  Trachea midline.  No guarding or rigidity.

CHEST:   Clear to auscultation bilaterally.

HEART: Regular rate rhythm. No murmurs.

ABDOMEN: Abdomen soft and nontender. No hepatosplenomegaly. Positive bowel

sounds.

EXTREMITIES: No cyanosis, edema or deformity.

SKIN: No rash or pustules.  No skin breakdown.

NEUROLOGICAL EXAMINATION: She is awake and alert.  Pupils are equal and

reactive. Extraocular muscles intact. Face is symmetric. Tongue is midline. In

the upper extremities, she has 5/5 strength. In the lower extremities, she can

wiggle her toes slightly about 1/5; otherwise, 0/5 in the iliopsoas,

quadriceps, hamstrings, gastrocnemius and dorsiflexion and plantar flexion of

the feet.  She has a positive Babinski.  She has several beat clonus

bilaterally in the lower extremities.  No Herb's in the upper extremities.

She appreciates light touch sensation but does relate decreased sensation in

the legs.

 

IMPRESSION:

1. 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

appears to be intradural and possible intramedullary.

2. Diabetes mellitus.

3. Hypertension.

4. History of ruptured cerebral aneurysm status post coiling ten years ago.

 

PLAN:

1. We will obtain a more dedicated MRI scan of the thoracic spine with axial

   contrast views to see if we can further delineate whether this is

   intramedullary or intradural extra-medullary mass.

2. CT angiogram of the brain will be obtained to rule out any recurrent

   cerebral aneurysm.

3. Sliding scale insulin coverage for diabetes mellitus.

4. She will also be placed on Decadron for the spinal cord mass and

   compression, which obviously will predispose her to hyperglycemia and will

   be tightly regulated.

5. Gastrointestinal stress ulcer prophylaxis along with mechanical and chemical

   DVT prophylaxis as she is high risk for DVT.

 

Further treatment plan will depend on the findings and additional imaging

studies.

 

Recommend physical therapy and rehabilitation.

 

She understands that given the chronicity of her symptoms, especially with

profound paraplegia for eight months now that even despite any surgical

intervention or decompression that this may not improve.

 

 

 

                              _________________________________

                              MD NKECHI Skaggs/ROSALES

D:  2/21/2018/5:53 PM

T:  2/21/2018/7:26 PM

Visit #:  [id number]

Job #:  [id number]

## 2018-02-22 VITALS
OXYGEN SATURATION: 94 % | DIASTOLIC BLOOD PRESSURE: 79 MMHG | TEMPERATURE: 97.8 F | RESPIRATION RATE: 17 BRPM | SYSTOLIC BLOOD PRESSURE: 143 MMHG | HEART RATE: 94 BPM

## 2018-02-22 VITALS
SYSTOLIC BLOOD PRESSURE: 156 MMHG | OXYGEN SATURATION: 96 % | DIASTOLIC BLOOD PRESSURE: 73 MMHG | RESPIRATION RATE: 18 BRPM | HEART RATE: 73 BPM | TEMPERATURE: 98.6 F

## 2018-02-22 VITALS
TEMPERATURE: 98.3 F | SYSTOLIC BLOOD PRESSURE: 142 MMHG | DIASTOLIC BLOOD PRESSURE: 82 MMHG | RESPIRATION RATE: 17 BRPM | HEART RATE: 85 BPM | OXYGEN SATURATION: 95 %

## 2018-02-22 VITALS
DIASTOLIC BLOOD PRESSURE: 69 MMHG | HEART RATE: 83 BPM | SYSTOLIC BLOOD PRESSURE: 135 MMHG | OXYGEN SATURATION: 94 % | RESPIRATION RATE: 18 BRPM | TEMPERATURE: 97 F

## 2018-02-22 VITALS
SYSTOLIC BLOOD PRESSURE: 146 MMHG | DIASTOLIC BLOOD PRESSURE: 71 MMHG | RESPIRATION RATE: 18 BRPM | TEMPERATURE: 98.4 F | OXYGEN SATURATION: 97 % | HEART RATE: 82 BPM

## 2018-02-22 LAB
BASOPHILS # BLD AUTO: 0 TH/MM3 (ref 0–0.2)
BASOPHILS NFR BLD: 0.3 % (ref 0–2)
BUN SERPL-MCNC: 11 MG/DL (ref 7–18)
CALCIUM SERPL-MCNC: 9.3 MG/DL (ref 8.5–10.1)
CHLORIDE SERPL-SCNC: 104 MEQ/L (ref 98–107)
CREAT SERPL-MCNC: 0.49 MG/DL (ref 0.5–1)
EOSINOPHIL # BLD: 0 TH/MM3 (ref 0–0.4)
EOSINOPHIL NFR BLD: 0.3 % (ref 0–4)
ERYTHROCYTE [DISTWIDTH] IN BLOOD BY AUTOMATED COUNT: 14.1 % (ref 11.6–17.2)
GFR SERPLBLD BASED ON 1.73 SQ M-ARVRAT: 129 ML/MIN (ref 89–?)
GLUCOSE SERPL-MCNC: 279 MG/DL (ref 74–106)
HCO3 BLD-SCNC: 27.2 MEQ/L (ref 21–32)
HCT VFR BLD CALC: 42.2 % (ref 35–46)
HGB BLD-MCNC: 14.2 GM/DL (ref 11.6–15.3)
LYMPHOCYTES # BLD AUTO: 1.2 TH/MM3 (ref 1–4.8)
LYMPHOCYTES NFR BLD AUTO: 14.1 % (ref 9–44)
MAGNESIUM SERPL-MCNC: 2.4 MG/DL (ref 1.5–2.5)
MCH RBC QN AUTO: 28 PG (ref 27–34)
MCHC RBC AUTO-ENTMCNC: 33.6 % (ref 32–36)
MCV RBC AUTO: 83.3 FL (ref 80–100)
MONOCYTE #: 0.1 TH/MM3 (ref 0–0.9)
MONOCYTES NFR BLD: 1.4 % (ref 0–8)
NEUTROPHILS # BLD AUTO: 6.9 TH/MM3 (ref 1.8–7.7)
NEUTROPHILS NFR BLD AUTO: 83.9 % (ref 16–70)
PLATELET # BLD: 123 TH/MM3 (ref 150–450)
PMV BLD AUTO: 10.6 FL (ref 7–11)
RBC # BLD AUTO: 5.06 MIL/MM3 (ref 4–5.3)
SODIUM SERPL-SCNC: 138 MEQ/L (ref 136–145)
WBC # BLD AUTO: 8.3 TH/MM3 (ref 4–11)

## 2018-02-22 RX ADMIN — INSULIN ASPART SCH: 100 INJECTION, SOLUTION INTRAVENOUS; SUBCUTANEOUS at 17:49

## 2018-02-22 RX ADMIN — DOCUSATE SODIUM SCH MG: 100 CAPSULE, LIQUID FILLED ORAL at 10:09

## 2018-02-22 RX ADMIN — NICOTINE SCH PATCH: 14 PATCH, EXTENDED RELEASE TOPICAL at 10:09

## 2018-02-22 RX ADMIN — DEXAMETHASONE SCH MG: 4 TABLET ORAL at 12:00

## 2018-02-22 RX ADMIN — DULOXETINE SCH MG: 60 CAPSULE, DELAYED RELEASE ORAL at 10:10

## 2018-02-22 RX ADMIN — PANTOPRAZOLE SCH MG: 40 TABLET, DELAYED RELEASE ORAL at 10:10

## 2018-02-22 RX ADMIN — DOCUSATE SODIUM SCH MG: 100 CAPSULE, LIQUID FILLED ORAL at 21:01

## 2018-02-22 RX ADMIN — PHENYTOIN SODIUM SCH MLS/HR: 50 INJECTION INTRAMUSCULAR; INTRAVENOUS at 06:06

## 2018-02-22 RX ADMIN — MAGNESIUM OXIDE TAB 400 MG (241.3 MG ELEMENTAL MG) SCH MG: 400 (241.3 MG) TAB at 10:09

## 2018-02-22 RX ADMIN — LISINOPRIL SCH MG: 20 TABLET ORAL at 10:10

## 2018-02-22 RX ADMIN — INSULIN ASPART SCH: 100 INJECTION, SOLUTION INTRAVENOUS; SUBCUTANEOUS at 10:10

## 2018-02-22 RX ADMIN — INSULIN ASPART SCH: 100 INJECTION, SOLUTION INTRAVENOUS; SUBCUTANEOUS at 13:23

## 2018-02-22 RX ADMIN — PHENYTOIN SODIUM SCH MLS/HR: 50 INJECTION INTRAMUSCULAR; INTRAVENOUS at 13:23

## 2018-02-22 RX ADMIN — ENOXAPARIN SODIUM SCH MG: 30 INJECTION SUBCUTANEOUS at 06:00

## 2018-02-22 RX ADMIN — INSULIN ASPART SCH: 100 INJECTION, SOLUTION INTRAVENOUS; SUBCUTANEOUS at 21:26

## 2018-02-22 RX ADMIN — MAGNESIUM OXIDE TAB 400 MG (241.3 MG ELEMENTAL MG) SCH MG: 400 (241.3 MG) TAB at 21:02

## 2018-02-22 RX ADMIN — DEXAMETHASONE SCH MG: 4 TABLET ORAL at 06:03

## 2018-02-22 RX ADMIN — CETIRIZINE HYDROCHLORIDE SCH MG: 10 TABLET, FILM COATED ORAL at 10:09

## 2018-02-22 RX ADMIN — DEXAMETHASONE SCH MG: 4 TABLET ORAL at 17:48

## 2018-02-22 NOTE — RADRPT
EXAM DATE/TIME:  02/22/2018 05:38 

 

[facility] COMPARISON:     

MRI THORACIC SPINE W CONTRAST, February 21, 2018, 18:23.

 

 

INDICATIONS :     

Evaluate for metastatic disease. Intramedullary intradural lesion seen in the thoracic spine.

                      

 

IV CONTRAST:     

70 cc Omnipaque 350 (iohexol) IV 

 

 

RADIATION DOSE:     

11.77 CTDIvol (mGy) ; Combined studies - Thorax/Abdomen/Pelvis

 

 

MEDICAL HISTORY :     

Hypertension.  

 

SURGICAL HISTORY :      

None. 

 

ENCOUNTER:      

Initial

 

ACUITY:      

1 day

 

PAIN SCALE:      

0/10

 

LOCATION:       

Bilateral chest 

 

TECHNIQUE:      

Volumetric scanning of the chest was performed.  Using automated exposure control and adjustment of t
he mA and/or kV according to patient size, radiation dose was kept as low as reasonably achievable to
 obtain optimal diagnostic quality images.   DICOM format image data is available electronically for 
review and comparison.  

 

Follow-up recommendations for detected pulmonary nodules are based at a minimum on nodule size and pa
tient risk factors according to Fleischner Society Guidelines.

 

FINDINGS:     

 

LUNGS:     

There is no consolidation or pneumothorax.  No concerning pulmonary nodule is visualized.

 

PLEURA:     

There is no pleural thickening or pleural effusion.

 

MEDIASTINUM:     

The heart and great vessels demonstrate no acute abnormality.  There is no mediastinal or hilar lymph
adenopathy.

 

AXILLAE:     

Within normal limits.  No lymphadenopathy.

 

SKELETAL:     

Within normal limits for patient age.

 

MISCELLANEOUS:     

The visualized upper abdominal organs demonstrate no acute abnormality. There are multiple cystic-romina
earing lesions in the liver. There is a low attenuation lesion in the left adrenal gland which measur
es approximately 2.4 x 1.7 cm in diameter and 29 Hounsfield units in density.

 

CONCLUSION:     

1. No evidence of metastatic disease or primary tumor.

2. Multiple benign cystic appearing structures in the liver.

3. Left adrenal adenoma.

 

 

 

 Ambrosio Mclean MD on February 22, 2018 at 6:16           

Board Certified Radiologist.

 This report was verified electronically.

## 2018-02-22 NOTE — HHI.PR
Subjective


Remarks


Pt without any specific complaints today


She has not noted any improvement in her LE paralysis with the IV Decadron at 

this point.





Objective


Vitals





Vital Signs








  Date Time  Temp Pulse Resp B/P (MAP) Pulse Ox O2 Delivery O2 Flow Rate FiO2


 


2/22/18 08:00 98.3 85 17 142/82 (102) 95   


 


2/22/18 04:00 97.0 83 18 135/69 (91) 94   


 


2/22/18 00:00 98.6 73 18 156/73 (100) 96   


 


2/21/18 20:00 96.7 73 18 131/66 (87) 96   


 


2/21/18 14:40 97.8 68 16 133/76 (95) 98   


 


2/21/18 11:00 97.9 81 16 128/83 (98) 99 Room Air  








Result Diagram:  


2/22/18 0440                                                                   

             2/22/18 0440





Other Results





 Laboratory Tests








Test


  2/21/18


05:03 2/21/18


06:41 2/21/18


17:50 2/22/18


04:40


 


White Blood Count 12.3 TH/MM3    8.3 TH/MM3 


 


Red Blood Count 5.08 MIL/MM3    5.06 MIL/MM3 


 


Hemoglobin 14.1 GM/DL    14.2 GM/DL 


 


Hematocrit 42.3 %    42.2 % 


 


Mean Corpuscular Volume 83.3 FL    83.3 FL 


 


Mean Corpuscular Hemoglobin 27.7 PG    28.0 PG 


 


Mean Corpuscular Hemoglobin


Concent 33.3 % 


  


  


  33.6 % 


 


 


Red Cell Distribution Width 14.2 %    14.1 % 


 


Platelet Count 167 TH/MM3    123 TH/MM3 


 


Mean Platelet Volume 9.8 FL    10.6 FL 


 


Neutrophils (%) (Auto) 58.1 %    83.9 % 


 


Lymphocytes (%) (Auto) 27.3 %    14.1 % 


 


Monocytes (%) (Auto) 9.9 %    1.4 % 


 


Eosinophils (%) (Auto) 4.1 %    0.3 % 


 


Basophils (%) (Auto) 0.6 %    0.3 % 


 


Neutrophils # (Auto) 7.1 TH/MM3    6.9 TH/MM3 


 


Lymphocytes # (Auto) 3.4 TH/MM3    1.2 TH/MM3 


 


Monocytes # (Auto) 1.2 TH/MM3    0.1 TH/MM3 


 


Eosinophils # (Auto) 0.5 TH/MM3    0.0 TH/MM3 


 


Basophils # (Auto) 0.1 TH/MM3    0.0 TH/MM3 


 


CBC Comment DIFF FINAL    DIFF FINAL 


 


Differential Comment      


 


Erythrocyte Sedimentation Rate 27 mm/hr    


 


Blood Urea Nitrogen 15 MG/DL    11 MG/DL 


 


Creatinine 0.47 MG/DL    0.49 MG/DL 


 


Random Glucose 192 MG/DL    279 MG/DL 


 


Total Protein 7.2 GM/DL    


 


Albumin 3.4 GM/DL    


 


Calcium Level 8.8 MG/DL    9.3 MG/DL 


 


Alkaline Phosphatase 109 U/L    


 


Aspartate Amino Transf


(AST/SGOT) 19 U/L 


  


  


  


 


 


Alanine Aminotransferase


(ALT/SGPT) 21 U/L 


  


  


  


 


 


Total Bilirubin 0.2 MG/DL    


 


Sodium Level 138 MEQ/L    138 MEQ/L 


 


Potassium Level 4.2 MEQ/L    4.5 MEQ/L 


 


Chloride Level 106 MEQ/L    104 MEQ/L 


 


Carbon Dioxide Level 25.6 MEQ/L    27.2 MEQ/L 


 


Anion Gap 6 MEQ/L    7 MEQ/L 


 


Estimat Glomerular Filtration


Rate 135 ML/MIN 


  


  


  129 ML/MIN 


 


 


Hemoglobin A1c 7.9 %    


 


Total Creatine Kinase 53 U/L    


 


Troponin I


  LESS THAN 0.02


NG/ML 


  


  


 


 


C-Reactive Protein


  LESS THAN 0.29


MG/DL 


  


  


 


 


Urine Color  YELLOW   


 


Urine Turbidity  CLEAR   


 


Urine pH  6.0   


 


Urine Specific Gravity  1.014   


 


Urine Protein  NEG mg/dL   


 


Urine Glucose (UA)  300 mg/dL   


 


Urine Ketones  NEG mg/dL   


 


Urine Occult Blood  NEG   


 


Urine Nitrite  NEG   


 


Urine Bilirubin  NEG   


 


Urine Urobilinogen


  


  LESS THAN 2.0


MG/DL 


  


 


 


Urine Leukocyte Esterase  NEG   


 


Urine RBC  1 /hpf   


 


Urine WBC  1 /hpf   


 


Urine Squamous Epithelial


Cells 


  <1 /hpf 


  


  


 


 


Urine Mucus  FEW /lpf   


 


Microscopic Urinalysis Comment


  


  CATH-CULT NOT


IND 


  


 


 


Prothrombin Time   10.1 SEC  


 


Prothromb Time International


Ratio 


  


  1.0 RATIO 


  


 


 


Activated Partial


Thromboplast Time 


  


  24.9 SEC 


  


 


 


Magnesium Level    2.4 MG/DL 








Imaging





Last Impressions








Entire Spine MRI 2/21/18 0808 Signed





Impressions: 





 Service Date/Time:  Wednesday, February 21, 2018 13:15 - CONCLUSION:  1. The 





 examination demonstrates a 1.6 x 1.1 x 1.2 cm homogeneously enhancing mass 

which 





 appears to be an intradural intramedullary lesion. Considerations would 

include 





 ependymoma, astrocytoma, metastatic disease to the cord or less likely 





 consideration such as hemangioblastoma. 2. Note is made of a hemangioma 





 involving the T5 and T10 vertebral bodies.     Ruddy Cheney MD 


 


Brain MRI 2/21/18 0808 Signed





Impressions: 





 Service Date/Time:  Wednesday, February 21, 2018 13:15 - CONCLUSION:  Negative 





 MRI of the brain with and without contrast.     Dav Juan MD 


 


Thoracic Spine MRI 2/21/18 0000 Signed





Impressions: 





 Service Date/Time:  Wednesday, February 21, 2018 18:23 - CONCLUSION:  1. The 





 examination demonstrates an intramedullary, intradural lesion immediately 





 posterior to the T9-10 disc level. Differential considerations are given 

above.  





 2. The dedicated imaging of the thoracic spine also demonstrates abnormal 





 enhancement and abnormal signal within the posterior elements of the T10 and 

T11 





 vertebral bodies concerning for bony metastatic disease.     Ruddy Cheney MD 


 


Head CTA 2/21/18 0000 Signed





Impressions: 





 Service Date/Time:  Thursday, February 22, 2018 05:38 - CONCLUSION:  1. 

Aneurysm 





 clip in the suprasellar region with streak artifact. 2. No new aneurysm. 3. 

Mild 





 atherosclerotic changes.     Ambrosio Mclean MD 


 


Head CT 2/21/18 0000 Signed





Impressions: 





 Service Date/Time:  Wednesday, February 21, 2018 06:46 - CONCLUSION: Stable 





 appearance with no evidence of hemorrhage or mass effect.     Ambrosio Mclean MD 


 


Chest X-Ray 2/21/18 0000 Signed





Impressions: 





 Service Date/Time:  Wednesday, February 21, 2018 08:42 - CONCLUSION:  Small 





 right infrahilar infiltrate.     Dav Juan MD 


 


Chest CT 2/21/18 0000 Signed





Impressions: 





 Service Date/Time:  Thursday, February 22, 2018 05:38 - CONCLUSION:  1. No 





 evidence of metastatic disease or primary tumor. 2. Multiple benign cystic 





 appearing structures in the liver. 3. Left adrenal adenoma.     Ambrosio Mclean MD 


 


Abdomen/Pelvis CT 2/21/18 0000 Signed





Impressions: 





 Service Date/Time:  Thursday, February 22, 2018 05:38 - CONCLUSION:  1. No 





 primary tumor or evidence of metastatic disease. 2. Left adrenal adenoma 3. 





 Multiple benign-appearing cystic lesions in the liver.     Ambrosio Mclean MD 








Last Impressions








Head CT 2/21/18 0000 Signed





Impressions: 





 Service Date/Time:  Wednesday, February 21, 2018 06:46 - CONCLUSION: Stable 





 appearance with no evidence of hemorrhage or mass effect.     Ambrosio Mclean MD 








Objective Remarks


General: NAD, AAOx3


Chest: CTA


Cardiac: Regular


Abd: +BS, soft ND/NT


Ext: Bilateral LE paralysis, no significant changes





A/P


Problem List:  


(1) Leg weakness, bilateral


ICD Codes:  R29.898 - Other symptoms and signs involving the musculoskeletal 

system


Plan:  


T9-T10 intramedullary, intradural lesion 


Progressive LE weakness/paralysis


Hyperreflexive LE


Bladder incontinence


Tobacco use


   - Pt is a 63 y/o WF with HTN, GERD, OA and hx of ruptured cerebral aneurysm 

in 2008 s/p coiling procedure. 


   - She presented to the ED at [facility] [place] with progressive weakness in bilateral LE. 

See HPI for details of sequence of events


   - In January 2017 she restarted the Lyrica 150mg po BID and this was 

increased to TID dosing but over the last month


    and a half she has had worsening of her bilateral LE weakness. She is 

unable to bear weight and over the last two weeks


    she has not been able to move her LE actively and her bladder incontinence 

has been much worse. 


   - Head CT (2/21) --> Stable appearance with no evidence of hemorrhage or 

mass effect. 


   - ESR is 27, CRP is less than 0.29


   - CXR (2/21/18) -->  Small right infrahilar infiltrate.


   - MRI Brain (2/21/18) --> Negative MRI of the brain with and without 

contrast.


   - MRI Spine screening (2/21/18) --> The examination demonstrates a 1.6 x 1.1 

x 1.2 cm homogeneously enhancing mass which 


    appears to be an intradural intramedullary lesion. Considerations would 

include ependymoma, astrocytoma, metastatic disease 


    to the cord or less likely consideration such as hemangioblastoma. Note is 

made of a hemangioma involving the T5 and T10 vertebral bodies.  


   - Neurosurgery was consulted based on the MRI spine results. 


   - Pt was started on IV Decadron


   - CT Chest/Abd/Pelvis (2/21/18) --> No evidence of metastatic disease or 

primary tumor. Multiple benign cystic appearing structures in the


    liver. Left adrenal adenoma.  


   - MRI Thoracic spine (2/22/18) -->  The examination demonstrates an 

intramedullary, intradural lesion immediately posterior to the T9-10 disc 

level. 


    The dedicated imaging of the thoracic spine also demonstrates abnormal 

enhancement and abnormal signal within the posterior elements of the T10 


    and T11 vertebral bodies concerning for bony metastatic disease. 


   - Await Dr. Zimmerman's review of the MRI Thoracic spine to determine if any 

surgical intervention/biopsy can be performed. 


   - Of note, pt has never had a colonoscopy or cystoscopy. She does have 

family hx of colon cancer (Mat. GM and Mat uncle)


   - PT evaluation in AM


   - Supportive care





Diabetes Mellitus


   - Hgb A1C 7.9%


   - NovoLog SSI 


   - Accu checks





HTN


   - Resume home meds


   - Monitor





GERD


   - PPI





Hx of ruptured intracranial aneurysm in 2008 s/p coiling


   - Stable


   - CTA Head (2/21/18) --> Aneurysm clip in the suprasellar region with streak 

artifact. No new aneurysm. Mild atherosclerotic changes.   





(2) Bladder incontinence


ICD Codes:  R32 - Unspecified urinary incontinence


(3) Tobacco abuse


ICD Codes:  Z72.0 - Tobacco use


(4) HTN (hypertension)


ICD Codes:  I10 - Essential (primary) hypertension


(5) GERD (gastroesophageal reflux disease)


ICD Codes:  K21.9 - Gastro-esophageal reflux disease without esophagitis


Assessment and Plan


Patient examined.


Assessment and plan formulated with Mavis Pena PA-C.


I agree with the above.


going to OR tomorrow.











Mavis Pena Feb 22, 2018 08:50


Nhan Vargas MD Feb 22, 2018 12:30

## 2018-02-22 NOTE — RADRPT
EXAM DATE/TIME:  02/22/2018 05:38 

 

[facility] COMPARISON:     

No previous studies available for comparison.

 

 

INDICATIONS :     

Evaluate for metastatic disease.

                      

 

IV CONTRAST:     

70 cc Omnipaque 350 (iohexol) IV ; Cumulative dose for multiple exams.

 

 

ORAL CONTRAST:      

Prescribed oral contrast ingested.

                      

 

RADIATION DOSE:     

11.77 CTDIvol (mGy) ; Combined studies - Thorax/Abdomen/Pelvis

 

 

MEDICAL HISTORY :     

Hypertension.  

 

SURGICAL HISTORY :      

None. 

 

ENCOUNTER:      

Initial

 

ACUITY:      

1 day

 

PAIN SCALE:      

0/10

 

LOCATION:       

Bilateral  abdomen

 

TECHNIQUE:     

Volumetric scanning of the abdomen and pelvis was performed.  Using automated exposure control and ad
justment of the mA and/or kV according to patient size, radiation dose was kept as low as reasonably 
achievable to obtain optimal diagnostic quality images.  DICOM format image data is available electro
nically for review and comparison.  

 

FINDINGS:     

 

LOWER LUNGS:     

The visualized lower lungs are clear.

 

LIVER:     

Homogeneous density with multiple scattered benign appearing cystic structures. These measure from 5 
mm up to a 2.6 cm in diameter..  There is no dilation of the biliary tree.  No calcified gallstones.

 

SPLEEN:     

Normal size without lesion.

 

PANCREAS:     

Within normal limits.

 

KIDNEYS:     

Normal in size and shape.  There is no solid mass, stone or hydronephrosis. There is a simple cyst in
 the lower pole the right kidney.

 

ADRENAL GLANDS:     

The right adrenal gland is unremarkable. There is a 2.4 x 1.7 cm low density left adrenal mass measur
ing approximately 28 Hounsfield units consistent with an adenoma.

 

VASCULAR:     

There is no aortic aneurysm.

 

BOWEL/MESENTERY:     

The stomach, small bowel, and colon demonstrate no acute abnormality.  There is no free intraperitone
al air or fluid.

 

ABDOMINAL WALL:     

Within normal limits.

 

RETROPERITONEUM:     

There is no lymphadenopathy. 

 

BLADDER:     

No wall thickening or mass. 

 

REPRODUCTIVE:     

Within normal limits.

 

INGUINAL:     

There is no lymphadenopathy or hernia. 

 

MUSCULOSKELETAL:     

Within normal limits for patient age. 

 

CONCLUSION:     

1. No primary tumor or evidence of metastatic disease.

2. Left adrenal adenoma

3. Multiple benign-appearing cystic lesions in the liver.

 

 

 

 Ambrosio Mclean MD on February 22, 2018 at 6:20           

Board Certified Radiologist.

 This report was verified electronically.

## 2018-02-22 NOTE — HHI.NSPN
__________________________________________________


 (Rao Em)





History


Chief Complaint:  Weakness/paralysis in LEs.


 (Rao Em)


Interval History


This is a 62-year-old obese  female who presented to the emergency


room today with complaints of inability to move her legs along with urinary


incontinence which is chronic.  Her symptoms started over two years ago in


September of 2016 when she started noticing weakness in both her legs along


with some numbness which has progressed to complete paraplegia and urinary


incontinence since May of 2017.  She has been using a hospital bed and


wheelchair to get around.  She relates that she has not sought any medical


attention because of lack of health insurance She also relates a chronic


history of low back pain.  Denies any neck or upper extremity symptoms.  She


also has a history of a subarachnoid hemorrhage with ruptured cerebral anterior


communicating artery aneurysm in 2008 and was treated with endovascular


coiling.  She denies any headaches or nausea or vomiting.  Workup included a CT


scan of the head today which was negative for any acute findings.  The coil is


in place in the anterior communicating artery area.  She also had a subsequent


brain MRI scan which was negative with and without contrast for any


intracranial abnormality.  She has had an entire spine screening MRI scan which


reveals intradural and the radiologist feels an intramedullary mass around the


T9-10 that involves pretty much the whole spinal canal. In the sagittal view,


there is an enhancing portion, although on the actual view, I am not clear


whether an enhanced study was done and no thin sections through this mass


appeared.





2/22/18:  Pt awake and alert.  Complains of paralysis in LEs.  She states there 

is slightly more movement in her left foot today.  She has very slight movement 

in toes on right but no movement otherwise in LEs.


 (Rao Em)





Review of Systems


General:  Negative for: fever, chills, insomnia


Respiratory:  Negative for: shortness of breath, cough, sputum


Cardiovascular:  Negative for: chest pain


Gastrointestinal:  Negative for: nausea, vomitting, diarrhea, constipation (

Rao mE)





Exam


Results





Vital Signs








  Date Time  Temp Pulse Resp B/P (MAP) Pulse Ox O2 Delivery O2 Flow Rate FiO2


 


2/22/18 08:00 98.3 85 17 142/82 (102) 95   


 


2/21/18 11:00      Room Air  














Intake and Output   


 


 2/22/18 2/22/18 2/23/18





 08:00 16:00 00:00


 


Output Total 1000 ml  


 


Balance -1000 ml  








 (Rao Em)


Physical Examination


General:  Very pleasant pt resting in bed in no acute distress.


Eyes:  Pupils equal sclera anicteric.


Resp:  CTA bilaterally.


Heart:  NSR no murmurs


Abd:  obese.  positive bs


Skin:  No cyanosis or erythema


Muscle:  slight movement in toes on right, and some plantar flexion left foot 

otherwise paralysis LEs.  


Neuro:  Pt awake and alert.  Sitting up in bed.  Sensation is decreased in LEs.


 (Rao Em)


Lab, Micro, Other Results





Last Impressions








Entire Spine MRI 2/21/18 0808 Signed





Impressions: 





 Service Date/Time:  Wednesday, February 21, 2018 13:15 - CONCLUSION:  1. The 





 examination demonstrates a 1.6 x 1.1 x 1.2 cm homogeneously enhancing mass 

which 





 appears to be an intradural intramedullary lesion. Considerations would 

include 





 ependymoma, astrocytoma, metastatic disease to the cord or less likely 





 consideration such as hemangioblastoma. 2. Note is made of a hemangioma 





 involving the T5 and T10 vertebral bodies.     Ruddy Cheney MD 


 


Brain MRI 2/21/18 0808 Signed





Impressions: 





 Service Date/Time:  Wednesday, February 21, 2018 13:15 - CONCLUSION:  Negative 





 MRI of the brain with and without contrast.     Dav Juan MD 


 


Thoracic Spine MRI 2/21/18 0000 Signed





Impressions: 





 Service Date/Time:  Wednesday, February 21, 2018 18:23 - CONCLUSION:  1. The 





 examination demonstrates an intramedullary, intradural lesion immediately 





 posterior to the T9-10 disc level. Differential considerations are given 

above.  





 2. The dedicated imaging of the thoracic spine also demonstrates abnormal 





 enhancement and abnormal signal within the posterior elements of the T10 and 

T11 





 vertebral bodies concerning for bony metastatic disease.     Ruddy Cheney MD 


 


Head CTA 2/21/18 0000 Signed





Impressions: 





 Service Date/Time:  Thursday, February 22, 2018 05:38 - CONCLUSION:  1. 

Aneurysm 





 clip in the suprasellar region with streak artifact. 2. No new aneurysm. 3. 

Mild 





 atherosclerotic changes.     Ambrosio Mclean MD 


 


Head CT 2/21/18 0000 Signed





Impressions: 





 Service Date/Time:  Wednesday, February 21, 2018 06:46 - CONCLUSION: Stable 





 appearance with no evidence of hemorrhage or mass effect.     Ambrosio Mclean MD 


 


Chest X-Ray 2/21/18 0000 Signed





Impressions: 





 Service Date/Time:  Wednesday, February 21, 2018 08:42 - CONCLUSION:  Small 





 right infrahilar infiltrate.     Dav Juan MD 


 


Chest CT 2/21/18 0000 Signed





Impressions: 





 Service Date/Time:  Thursday, February 22, 2018 05:38 - CONCLUSION:  1. No 





 evidence of metastatic disease or primary tumor. 2. Multiple benign cystic 





 appearing structures in the liver. 3. Left adrenal adenoma.     Ambrosio Mclean MD 


 


Abdomen/Pelvis CT 2/21/18 0000 Signed





Impressions: 





 Service Date/Time:  Thursday, February 22, 2018 05:38 - CONCLUSION:  1. No 





 primary tumor or evidence of metastatic disease. 2. Left adrenal adenoma 3. 





 Multiple benign-appearing cystic lesions in the liver.     Ambrosio Mclean MD 








Laboratory Tests








Test


  2/21/18


17:50 2/22/18


04:40


 


Prothrombin Time 10.1 SEC  


 


Prothromb Time International


Ratio 1.0 RATIO 


  


 


 


Activated Partial


Thromboplast Time 24.9 SEC 


  


 


 


White Blood Count  8.3 TH/MM3 


 


Red Blood Count  5.06 MIL/MM3 


 


Hemoglobin  14.2 GM/DL 


 


Hematocrit  42.2 % 


 


Mean Corpuscular Volume  83.3 FL 


 


Mean Corpuscular Hemoglobin  28.0 PG 


 


Mean Corpuscular Hemoglobin


Concent 


  33.6 % 


 


 


Red Cell Distribution Width  14.1 % 


 


Platelet Count  123 TH/MM3 


 


Mean Platelet Volume  10.6 FL 


 


Neutrophils (%) (Auto)  83.9 % 


 


Lymphocytes (%) (Auto)  14.1 % 


 


Monocytes (%) (Auto)  1.4 % 


 


Eosinophils (%) (Auto)  0.3 % 


 


Basophils (%) (Auto)  0.3 % 


 


Neutrophils # (Auto)  6.9 TH/MM3 


 


Lymphocytes # (Auto)  1.2 TH/MM3 


 


Monocytes # (Auto)  0.1 TH/MM3 


 


Eosinophils # (Auto)  0.0 TH/MM3 


 


Basophils # (Auto)  0.0 TH/MM3 


 


CBC Comment  DIFF FINAL 


 


Differential Comment   


 


Blood Urea Nitrogen  11 MG/DL 


 


Creatinine  0.49 MG/DL 


 


Random Glucose  279 MG/DL 


 


Calcium Level  9.3 MG/DL 


 


Magnesium Level  2.4 MG/DL 


 


Sodium Level  138 MEQ/L 


 


Potassium Level  4.5 MEQ/L 


 


Chloride Level  104 MEQ/L 


 


Carbon Dioxide Level  27.2 MEQ/L 


 


Anion Gap  7 MEQ/L 


 


Estimat Glomerular Filtration


Rate 


  129 ML/MIN 


 








 (Rao Em)





Medical Decision Making


Impression and Plan


A:  63 y/o FM with chronic paraplegia with urinary incontinence from a T9-10 

mass, which


appears to be intradural and possible intramedullary.


2. Diabetes mellitus.


3. Hypertension.


4. History of ruptured cerebral aneurysm status post coiling ten years ago.


 


PLAN:


1. OR tomorrow for Thoracic laminectomy with mass biopsy and possible 

resection.  I have discussed with her the procedure, risks, benefits and 

recovery with her.  Risks include but are not limited to bleeding, infection, 

chronic weakness/paralysis, chronic incontinence, blood clots in arms, legs, 

lungs, heart attack, stroke among others.  Pt understands the risks and no 

guarantees and wants to proceed as planned tomorrow.


2. Continue with physical therapy and rehabilitation.


3. Continue with Sliding scale insulin coverage for diabetes mellitus.


4. Continue with Decadron for the spinal cord mass and


   compression, which obviously will predispose her to hyperglycemia and will


   be tightly regulated.


5. Continue with gastrointestinal stress ulcer prophylaxis


6.  Continue with mechanical and chemical DVT prophylaxis as she is high risk 

for DVT.





Discussed case with medical team Dr. Vargas and EDGARDO Gamble and RN


 


She understands that given the chronicity of her symptoms, especially with


profound paraplegia for eight months now that even despite any surgical


intervention or decompression that this may not improve.  Pt is very 

appreciative of care provided.


 


 (Rao Em)





Attending Statement


The exam, history, and the medical decision-making described in the above note 

were completed with the assistance of the mid-level provider. I reviewed and 

agree with the findings presented.  I attest that I had a face-to-face 

encounter with the patient on the same day, and personally performed and 

documented my assessment and findings in the medical record.  No significant 

improvement in the lower extremity paraplegia with steroids.  Thin section 

contrast MRI scan of the thoracic spine reviewed and again shows a large T9-T10 

intradural mass which the radiologist think is intramedullary.  CT angiogram of 

the brain does not reveal any recurrent aneurysm with anterior communicating 

artery coil in place and no hemorrhage.  Discussed the procedure of T9-T10 

laminectomy with intradural exploration of this mass to see if this is extra 

medullary or intramedullary.  If it is extra medullary, then resection with 

decompression of the spinal cord would be feasible but if it is intramedullary 

and other than biopsy and aggressive resection will not be feasible.  The risks 

and benefits were discussed along with the option of nonsurgical management.  

She understands that given the chronicity of her paraplegia incontinence that 

despite any surgery she more than likely will not improved to any significant 

extent.  She is requesting that we proceed and gives informed consent.  

Accordingly surgery scheduled for tomorrow morning.


 (Varun Zimmerman MD)











Rao Em Feb 22, 2018 12:12


Varun Zimmerman MD Feb 22, 2018 14:55

## 2018-02-23 VITALS
OXYGEN SATURATION: 96 % | TEMPERATURE: 97.8 F | RESPIRATION RATE: 20 BRPM | HEART RATE: 83 BPM | DIASTOLIC BLOOD PRESSURE: 77 MMHG | SYSTOLIC BLOOD PRESSURE: 156 MMHG

## 2018-02-23 VITALS — OXYGEN SATURATION: 96 %

## 2018-02-23 VITALS
DIASTOLIC BLOOD PRESSURE: 60 MMHG | HEART RATE: 69 BPM | OXYGEN SATURATION: 96 % | SYSTOLIC BLOOD PRESSURE: 121 MMHG | RESPIRATION RATE: 10 BRPM

## 2018-02-23 VITALS
TEMPERATURE: 98.6 F | OXYGEN SATURATION: 96 % | HEART RATE: 80 BPM | DIASTOLIC BLOOD PRESSURE: 79 MMHG | RESPIRATION RATE: 20 BRPM | SYSTOLIC BLOOD PRESSURE: 163 MMHG

## 2018-02-23 VITALS
RESPIRATION RATE: 10 BRPM | OXYGEN SATURATION: 96 % | DIASTOLIC BLOOD PRESSURE: 64 MMHG | HEART RATE: 67 BPM | SYSTOLIC BLOOD PRESSURE: 138 MMHG | TEMPERATURE: 98 F

## 2018-02-23 VITALS
HEART RATE: 68 BPM | TEMPERATURE: 98.1 F | DIASTOLIC BLOOD PRESSURE: 56 MMHG | OXYGEN SATURATION: 100 % | SYSTOLIC BLOOD PRESSURE: 113 MMHG | RESPIRATION RATE: 10 BRPM

## 2018-02-23 VITALS
DIASTOLIC BLOOD PRESSURE: 91 MMHG | RESPIRATION RATE: 17 BRPM | HEART RATE: 82 BPM | OXYGEN SATURATION: 97 % | SYSTOLIC BLOOD PRESSURE: 161 MMHG | TEMPERATURE: 98.1 F

## 2018-02-23 VITALS — HEART RATE: 71 BPM

## 2018-02-23 LAB
BUN SERPL-MCNC: 10 MG/DL (ref 7–18)
CALCIUM SERPL-MCNC: 8 MG/DL (ref 8.5–10.1)
CHLORIDE SERPL-SCNC: 107 MEQ/L (ref 98–107)
CREAT SERPL-MCNC: 0.4 MG/DL (ref 0.5–1)
ERYTHROCYTE [DISTWIDTH] IN BLOOD BY AUTOMATED COUNT: 14.1 % (ref 11.6–17.2)
GFR SERPLBLD BASED ON 1.73 SQ M-ARVRAT: 163 ML/MIN (ref 89–?)
GLUCOSE SERPL-MCNC: 264 MG/DL (ref 74–106)
HCO3 BLD-SCNC: 25.8 MEQ/L (ref 21–32)
HCT VFR BLD CALC: 35.9 % (ref 35–46)
HGB BLD-MCNC: 11.7 GM/DL (ref 11.6–15.3)
MCH RBC QN AUTO: 27.4 PG (ref 27–34)
MCHC RBC AUTO-ENTMCNC: 32.7 % (ref 32–36)
MCV RBC AUTO: 83.7 FL (ref 80–100)
PLATELET # BLD: 180 TH/MM3 (ref 150–450)
PMV BLD AUTO: 9.8 FL (ref 7–11)
RBC # BLD AUTO: 4.29 MIL/MM3 (ref 4–5.3)
SODIUM SERPL-SCNC: 139 MEQ/L (ref 136–145)
WBC # BLD AUTO: 15 TH/MM3 (ref 4–11)

## 2018-02-23 PROCEDURE — 00BT0ZX EXCISION OF SPINAL MENINGES, OPEN APPROACH, DIAGNOSTIC: ICD-10-PCS | Performed by: NEUROLOGICAL SURGERY

## 2018-02-23 PROCEDURE — 4A1004G MONITORING OF CENTRAL NERVOUS ELECTRICAL ACTIVITY, INTRAOPERATIVE, OPEN APPROACH: ICD-10-PCS | Performed by: NEUROLOGICAL SURGERY

## 2018-02-23 RX ADMIN — INSULIN ASPART SCH: 100 INJECTION, SOLUTION INTRAVENOUS; SUBCUTANEOUS at 20:49

## 2018-02-23 RX ADMIN — DEXAMETHASONE SCH MG: 4 TABLET ORAL at 04:32

## 2018-02-23 RX ADMIN — INSULIN ASPART SCH: 100 INJECTION, SOLUTION INTRAVENOUS; SUBCUTANEOUS at 17:00

## 2018-02-23 RX ADMIN — DULOXETINE SCH MG: 60 CAPSULE, DELAYED RELEASE ORAL at 07:48

## 2018-02-23 RX ADMIN — PANTOPRAZOLE SCH MG: 40 TABLET, DELAYED RELEASE ORAL at 07:47

## 2018-02-23 RX ADMIN — DEXAMETHASONE SCH MG: 4 TABLET ORAL at 12:00

## 2018-02-23 RX ADMIN — MORPHINE SULFATE PRN MG: 2 INJECTION, SOLUTION INTRAMUSCULAR; INTRAVENOUS at 18:11

## 2018-02-23 RX ADMIN — DOCUSATE SODIUM SCH MG: 100 CAPSULE, LIQUID FILLED ORAL at 07:50

## 2018-02-23 RX ADMIN — HYDROCODONE BITARTRATE AND ACETAMINOPHEN PRN TAB: 10; 325 TABLET ORAL at 20:20

## 2018-02-23 RX ADMIN — INSULIN ASPART SCH: 100 INJECTION, SOLUTION INTRAVENOUS; SUBCUTANEOUS at 07:52

## 2018-02-23 RX ADMIN — DOCUSATE SODIUM SCH MG: 100 CAPSULE, LIQUID FILLED ORAL at 20:20

## 2018-02-23 RX ADMIN — DEXAMETHASONE SCH MG: 4 TABLET ORAL at 00:07

## 2018-02-23 RX ADMIN — LISINOPRIL SCH MG: 20 TABLET ORAL at 07:49

## 2018-02-23 RX ADMIN — MAGNESIUM OXIDE TAB 400 MG (241.3 MG ELEMENTAL MG) SCH MG: 400 (241.3 MG) TAB at 07:48

## 2018-02-23 RX ADMIN — DEXAMETHASONE SCH MG: 4 TABLET ORAL at 17:28

## 2018-02-23 RX ADMIN — NICOTINE SCH PATCH: 14 PATCH, EXTENDED RELEASE TOPICAL at 07:47

## 2018-02-23 RX ADMIN — PHENYTOIN SODIUM SCH MLS/HR: 50 INJECTION INTRAMUSCULAR; INTRAVENOUS at 17:29

## 2018-02-23 RX ADMIN — INSULIN ASPART SCH: 100 INJECTION, SOLUTION INTRAVENOUS; SUBCUTANEOUS at 12:00

## 2018-02-23 RX ADMIN — PHENYTOIN SODIUM SCH MLS/HR: 50 INJECTION INTRAMUSCULAR; INTRAVENOUS at 01:05

## 2018-02-23 RX ADMIN — CYCLOBENZAPRINE HYDROCHLORIDE PRN MG: 10 TABLET, FILM COATED ORAL at 21:55

## 2018-02-23 RX ADMIN — Medication SCH ML: at 20:21

## 2018-02-23 RX ADMIN — CETIRIZINE HYDROCHLORIDE SCH MG: 10 TABLET, FILM COATED ORAL at 07:49

## 2018-02-23 RX ADMIN — MAGNESIUM OXIDE TAB 400 MG (241.3 MG ELEMENTAL MG) SCH MG: 400 (241.3 MG) TAB at 20:20

## 2018-02-23 NOTE — PD.OP
__________________________________________________





Operative Report


Date of Surgery:  Feb 23, 2018


Preoperative Diagnosis:  


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


Postoperative Diagnosis:  


Intradural extramedullary neoplasm


Procedure:


Thoracic T9-11 laminectomy with resection of intradural extramedullary neoplasm

; microsurgical technique


Anesthesia:


Gen. endotracheal by Radha england


Surgeon:


Varun Zimmerman M.D.


Assistant(s):


Ursula Motta


Operation and Findings:


The procedure along with the risks and benefits involved were discussed with 

the patient including the option of nonsurgical management.  She requested that 

we proceed with surgery and informed consent was obtained.  Following 

initiation of a general endotracheal anesthesia patient a Feliicano catheter placed 

on the sequential compression devices and was log rolled on a Lev table on 

chest rolls in the prone position and all pressure points adequately padded.  

The posterior thoracic lumbar was then shaved and prepped with alcohol along 

with ChloraPrep and sterilely draped with Ioban along with the usual sterile 

draping.  Intraoperative fluoroscopy was used for level confirmation an 

incision in the midline made extending from that T9 to the T11 levels after 

infiltrating the skin with 0.5% Marcaine with epinephrine solution.  The 

incision was extended down through the fascia and then using the subperiosteal 

plane the muscle attachments spinous processes and lamina along with the facets 

were detached from the T9-11 levels bilaterally.  Further dissection was 

undertaken using microtechnique with microscope magnification.  The T9-11 

bilateral laminectomy undertaken with a drill bit and Kerrison along with the 

underlying ligamentum flavum.  A midline dural opening was then made with a 15 

blade and the dura retracted with 4 Nurolon stitches.  A intradural 

extramedullary mass was identified adherent to the dura on the left side with 

severely compressing the spinal cord which was flattened and compressed to the 

right side.  The mass was grayish brownish in coloration some soft areas 

another tough tissue and partial calcifications also noted.  An initial 

internal debulking was undertaken and subsequently circumferential resection 

and peeling it off the dura and gently lifting it off the spinal cord.  The 

compression of the spinal cord was relieved and all nerves in complaint pressed 

by the mass were also preserved.  A gross total resection was achieved.  The 

dura was in a possibly using 5-0 Prolene running lock stitches and watertight 

closure obtained.  The durotomy was reinforced with the DuraSeal and with 

Valsalva maneuvers no CSF leak was noted. The area was copiously irrigated with 

vancomycin solution. The muscle and fascia was then approximated using 2-0 

Vicryl interrupted stitches and 3-0 Vicryl subcuticular interrupted stitches 

were also placed with final skin closure using staples.  Sterile dressings were 

then applied and he was then log roll supine position and extubated and taken 

recovery room.  There were no intraoperative complications and all sponge and 

needle count was correct at the end the procedure.  Estimated blood loss about 

300cc. Intraoperative neurologic monitoring was also undertaken which remained 

stable throughout the surgery.











Varun Zimmerman MD Feb 23, 2018 13:42

## 2018-02-23 NOTE — HHI.PR
Subjective


Remarks


seen post op


happy and wide awake.





Objective


Vitals


heart reg


lung cta


abd s/nt


ext lower ext paralysis





Vital Signs








  Date Time  Temp Pulse Resp B/P (MAP) Pulse Ox O2 Delivery O2 Flow Rate FiO2


 


2/23/18 14:50  68 16 121/55 (77) 98 Nasal Cannula 3 


 


2/23/18 14:15  73 16 129/64 (85) 100 Nasal Cannula 3 


 


2/23/18 14:00  77 16 126/60 (82) 100 Nasal Cannula 3 


 


2/23/18 13:45  71 16 112/60 (77) 100 Nasal Cannula 3 


 


2/23/18 13:41 97.9 73 16 98/53 (68) 100 Simple Mask 5 


 


2/23/18 08:00 96.7 82 17 161/93 (115) 96   


 


2/23/18 04:33 98.6 80 20 163/79 (107) 96   


 


2/23/18 00:00 97.8 83 20 156/77 (103) 96   


 


2/22/18 20:00 98.4 82 18 146/71 (96) 97   














 2/23/18 2/23/18 2/24/18





 15:00 23:00 07:00


 


Intake Total 3300 ml  


 


Output Total 1201 ml  


 


Balance 2099 ml  


 


   


 


IV Total 3300 ml  


 


Output Urine Total 900 ml  


 


Stool Total 1 ml  


 


Estimated Blood Loss 300 ml  








Result Diagram:  


2/23/18 1401                                                                   

             2/23/18 1401





Imaging





Last Impressions








Entire Spine MRI 2/21/18 0808 Signed





Impressions: 





 Service Date/Time:  Wednesday, February 21, 2018 13:15 - CONCLUSION:  1. The 





 examination demonstrates a 1.6 x 1.1 x 1.2 cm homogeneously enhancing mass 

which 





 appears to be an intradural intramedullary lesion. Considerations would 

include 





 ependymoma, astrocytoma, metastatic disease to the cord or less likely 





 consideration such as hemangioblastoma. 2. Note is made of a hemangioma 





 involving the T5 and T10 vertebral bodies.     Ruddy Cheney MD 


 


Brain MRI 2/21/18 0808 Signed





Impressions: 





 Service Date/Time:  Wednesday, February 21, 2018 13:15 - CONCLUSION:  Negative 





 MRI of the brain with and without contrast.     Dav Juan MD 


 


Thoracic Spine MRI 2/21/18 0000 Signed





Impressions: 





 Service Date/Time:  Wednesday, February 21, 2018 18:23 - CONCLUSION:  1. The 





 examination demonstrates an intramedullary, intradural lesion immediately 





 posterior to the T9-10 disc level. Differential considerations are given 

above.  





 2. The dedicated imaging of the thoracic spine also demonstrates abnormal 





 enhancement and abnormal signal within the posterior elements of the T10 and 

T11 





 vertebral bodies concerning for bony metastatic disease.     Ruddy Cheney MD 


 


Head CTA 2/21/18 0000 Signed





Impressions: 





 Service Date/Time:  Thursday, February 22, 2018 05:38 - CONCLUSION:  1. 

Aneurysm 





 clip in the suprasellar region with streak artifact. 2. No new aneurysm. 3. 

Mild 





 atherosclerotic changes.     Ambrosio Mclean MD 


 


Head CT 2/21/18 0000 Signed





Impressions: 





 Service Date/Time:  Wednesday, February 21, 2018 06:46 - CONCLUSION: Stable 





 appearance with no evidence of hemorrhage or mass effect.     Ambrosio Mclean MD 


 


Chest X-Ray 2/21/18 0000 Signed





Impressions: 





 Service Date/Time:  Wednesday, February 21, 2018 08:42 - CONCLUSION:  Small 





 right infrahilar infiltrate.     Dav Juan MD 


 


Chest CT 2/21/18 0000 Signed





Impressions: 





 Service Date/Time:  Thursday, February 22, 2018 05:38 - CONCLUSION:  1. No 





 evidence of metastatic disease or primary tumor. 2. Multiple benign cystic 





 appearing structures in the liver. 3. Left adrenal adenoma.     Ambrosio Mclean MD 


 


Abdomen/Pelvis CT 2/21/18 0000 Signed





Impressions: 





 Service Date/Time:  Thursday, February 22, 2018 05:38 - CONCLUSION:  1. No 





 primary tumor or evidence of metastatic disease. 2. Left adrenal adenoma 3. 





 Multiple benign-appearing cystic lesions in the liver.     Ambrosio Mclean MD 








Last Impressions








Head CT 2/21/18 0000 Signed





Impressions: 





 Service Date/Time:  Wednesday, February 21, 2018 06:46 - CONCLUSION: Stable 





 appearance with no evidence of hemorrhage or mass effect.     Ambrosio Mclean MD 











A/P


Problem List:  


(1) Leg weakness, bilateral


ICD Codes:  R29.898 - Other symptoms and signs involving the musculoskeletal 

system


Status:  Acute


Plan:  


T9-T10 intramedullary, intradural lesion 


Progressive LE weakness/paralysis


Hyperreflexive LE


Bladder incontinence


Tobacco use


   - Pt is a 63 y/o WF with HTN, GERD, OA and hx of ruptured cerebral aneurysm 

in 2008 s/p coiling procedure. 


   - She presented to the ED at [facility] [place] with progressive weakness in bilateral LE. 

See HPI for details of sequence of events


   - In January 2017 she restarted the Lyrica 150mg po BID and this was 

increased to TID dosing but over the last month


    and a half she has had worsening of her bilateral LE weakness. She is 

unable to bear weight and over the last two weeks


    she has not been able to move her LE actively and her bladder incontinence 

has been much worse. 


   - Head CT (2/21) --> Stable appearance with no evidence of hemorrhage or 

mass effect. 


   - ESR is 27, CRP is less than 0.29


   - CXR (2/21/18) -->  Small right infrahilar infiltrate.


   - MRI Brain (2/21/18) --> Negative MRI of the brain with and without 

contrast.


   - MRI Spine screening (2/21/18) --> The examination demonstrates a 1.6 x 1.1 

x 1.2 cm homogeneously enhancing mass which 


    appears to be an intradural intramedullary lesion. Considerations would 

include ependymoma, astrocytoma, metastatic disease 


    to the cord or less likely consideration such as hemangioblastoma. Note is 

made of a hemangioma involving the T5 and T10 vertebral bodies.  


   - Neurosurgery was consulted based on the MRI spine results. 


   - Pt was started on IV Decadron


   - CT Chest/Abd/Pelvis (2/21/18) --> No evidence of metastatic disease or 

primary tumor. Multiple benign cystic appearing structures in the


    liver. Left adrenal adenoma.  


   - MRI Thoracic spine (2/22/18) -->  The examination demonstrates an 

intramedullary, intradural lesion immediately posterior to the T9-10 disc 

level. 


    The dedicated imaging of the thoracic spine also demonstrates abnormal 

enhancement and abnormal signal within the posterior elements of the T10 


    and T11 vertebral bodies concerning for bony metastatic disease. 


   - Await Dr. Zimmerman's review of the MRI Thoracic spine to determine if any 

surgical intervention/biopsy can be performed. 


   - Of note, pt has never had a colonoscopy or cystoscopy. She does have 

family hx of colon cancer (Mat. GM and Mat uncle)


   -2/23. Dr Zimmerman: Thoracic T9-11 laminectomy with resection of intradural 

extramedullary neoplasm


Diabetes Mellitus


   - Hgb A1C 7.9%


   - NovoLog SSI 


   - Accu checks





HTN


   - Resume home meds


   - Monitor





GERD


   - PPI





Hx of ruptured intracranial aneurysm in 2008 s/p coiling


   - Stable


   - CTA Head (2/21/18) --> Aneurysm clip in the suprasellar region with streak 

artifact. No new aneurysm. Mild atherosclerotic changes.   





(2) Bladder incontinence


ICD Codes:  R32 - Unspecified urinary incontinence


(3) Tobacco abuse


ICD Codes:  Z72.0 - Tobacco use


(4) HTN (hypertension)


ICD Codes:  I10 - Essential (primary) hypertension


(5) GERD (gastroesophageal reflux disease)


ICD Codes:  K21.9 - Gastro-esophageal reflux disease without esophagitis











Nhan Vargas MD Feb 23, 2018 15:59

## 2018-02-23 NOTE — RADRPT
EXAM DATE/TIME:  02/23/2018 09:11 

 

[facility] COMPARISON:     

MRI THORACIC SPINE W CONTRAST, February 21, 2018, 18:23.  FLUOROSCOPY PORTABLE UP TO 1HR, February 23
, 2018, 0:00.

 

                     

INDICATIONS :     

T9-T10 laminectomy.  Resection intradural neoplasm.  Level localization.

                     

 

MEDICAL HISTORY :     

None.          

 

SURGICAL HISTORY :     

None.   

 

ENCOUNTER:     

Subsequent                                        

 

ACUITY:     

3 days      

 

PAIN SCORE:     

Non-responsive.

 

LOCATION:       

Thoracolumbar spine.

 

FINDINGS:     

The examination is performed as intraoperative film. The study is consistent with T9/T10 laminectomy.


 

CONCLUSION:     

1. T9/T10 laminectomy.

 

 

 

 Ruddy Cheney MD on February 23, 2018 at 13:07           

Board Certified Radiologist.

 This report was verified electronically.

## 2018-02-24 VITALS — HEART RATE: 80 BPM

## 2018-02-24 VITALS
RESPIRATION RATE: 10 BRPM | TEMPERATURE: 97.8 F | HEART RATE: 70 BPM | OXYGEN SATURATION: 96 % | DIASTOLIC BLOOD PRESSURE: 60 MMHG | SYSTOLIC BLOOD PRESSURE: 130 MMHG

## 2018-02-24 VITALS
HEART RATE: 84 BPM | RESPIRATION RATE: 18 BRPM | DIASTOLIC BLOOD PRESSURE: 71 MMHG | OXYGEN SATURATION: 98 % | TEMPERATURE: 98 F | SYSTOLIC BLOOD PRESSURE: 141 MMHG

## 2018-02-24 VITALS
TEMPERATURE: 98.1 F | HEART RATE: 71 BPM | SYSTOLIC BLOOD PRESSURE: 153 MMHG | DIASTOLIC BLOOD PRESSURE: 70 MMHG | RESPIRATION RATE: 10 BRPM | OXYGEN SATURATION: 96 %

## 2018-02-24 VITALS
HEART RATE: 74 BPM | TEMPERATURE: 98.3 F | OXYGEN SATURATION: 94 % | SYSTOLIC BLOOD PRESSURE: 176 MMHG | DIASTOLIC BLOOD PRESSURE: 77 MMHG | RESPIRATION RATE: 19 BRPM

## 2018-02-24 VITALS
SYSTOLIC BLOOD PRESSURE: 163 MMHG | RESPIRATION RATE: 16 BRPM | OXYGEN SATURATION: 93 % | HEART RATE: 81 BPM | DIASTOLIC BLOOD PRESSURE: 75 MMHG | TEMPERATURE: 98.3 F

## 2018-02-24 VITALS — HEART RATE: 87 BPM

## 2018-02-24 VITALS
HEART RATE: 72 BPM | DIASTOLIC BLOOD PRESSURE: 75 MMHG | RESPIRATION RATE: 16 BRPM | OXYGEN SATURATION: 98 % | TEMPERATURE: 98 F | SYSTOLIC BLOOD PRESSURE: 166 MMHG

## 2018-02-24 VITALS — OXYGEN SATURATION: 97 %

## 2018-02-24 VITALS — HEART RATE: 73 BPM

## 2018-02-24 VITALS — HEART RATE: 75 BPM

## 2018-02-24 VITALS — HEART RATE: 74 BPM

## 2018-02-24 LAB
BASOPHILS # BLD AUTO: 0 TH/MM3 (ref 0–0.2)
BASOPHILS NFR BLD: 0.2 % (ref 0–2)
BUN SERPL-MCNC: 8 MG/DL (ref 7–18)
CALCIUM SERPL-MCNC: 8.7 MG/DL (ref 8.5–10.1)
CHLORIDE SERPL-SCNC: 109 MEQ/L (ref 98–107)
CREAT SERPL-MCNC: 0.37 MG/DL (ref 0.5–1)
EOSINOPHIL # BLD: 0 TH/MM3 (ref 0–0.4)
EOSINOPHIL NFR BLD: 0 % (ref 0–4)
ERYTHROCYTE [DISTWIDTH] IN BLOOD BY AUTOMATED COUNT: 13.9 % (ref 11.6–17.2)
GFR SERPLBLD BASED ON 1.73 SQ M-ARVRAT: 178 ML/MIN (ref 89–?)
GLUCOSE SERPL-MCNC: 221 MG/DL (ref 74–106)
HCO3 BLD-SCNC: 27.9 MEQ/L (ref 21–32)
HCT VFR BLD CALC: 35.1 % (ref 35–46)
HGB BLD-MCNC: 11.7 GM/DL (ref 11.6–15.3)
LYMPHOCYTES # BLD AUTO: 1.1 TH/MM3 (ref 1–4.8)
LYMPHOCYTES NFR BLD AUTO: 6.6 % (ref 9–44)
MAGNESIUM SERPL-MCNC: 2.2 MG/DL (ref 1.5–2.5)
MCH RBC QN AUTO: 27.9 PG (ref 27–34)
MCHC RBC AUTO-ENTMCNC: 33.4 % (ref 32–36)
MCV RBC AUTO: 83.6 FL (ref 80–100)
MONOCYTE #: 0.7 TH/MM3 (ref 0–0.9)
MONOCYTES NFR BLD: 4.3 % (ref 0–8)
NEUTROPHILS # BLD AUTO: 15.5 TH/MM3 (ref 1.8–7.7)
NEUTROPHILS NFR BLD AUTO: 88.9 % (ref 16–70)
PLATELET # BLD: 166 TH/MM3 (ref 150–450)
PMV BLD AUTO: 10.5 FL (ref 7–11)
RBC # BLD AUTO: 4.2 MIL/MM3 (ref 4–5.3)
SODIUM SERPL-SCNC: 143 MEQ/L (ref 136–145)
WBC # BLD AUTO: 17.5 TH/MM3 (ref 4–11)

## 2018-02-24 RX ADMIN — MAGNESIUM OXIDE TAB 400 MG (241.3 MG ELEMENTAL MG) SCH MG: 400 (241.3 MG) TAB at 21:37

## 2018-02-24 RX ADMIN — DULOXETINE SCH MG: 60 CAPSULE, DELAYED RELEASE ORAL at 09:00

## 2018-02-24 RX ADMIN — ACYCLOVIR SCH UNITS: 800 TABLET ORAL at 09:20

## 2018-02-24 RX ADMIN — PHENYTOIN SODIUM SCH MLS/HR: 50 INJECTION INTRAMUSCULAR; INTRAVENOUS at 11:00

## 2018-02-24 RX ADMIN — PHENYTOIN SODIUM SCH MLS/HR: 50 INJECTION INTRAMUSCULAR; INTRAVENOUS at 19:47

## 2018-02-24 RX ADMIN — INSULIN ASPART SCH: 100 INJECTION, SOLUTION INTRAVENOUS; SUBCUTANEOUS at 08:00

## 2018-02-24 RX ADMIN — DEXAMETHASONE SCH MG: 4 TABLET ORAL at 05:40

## 2018-02-24 RX ADMIN — ONDANSETRON PRN MG: 2 INJECTION, SOLUTION INTRAMUSCULAR; INTRAVENOUS at 13:45

## 2018-02-24 RX ADMIN — ACYCLOVIR SCH UNITS: 800 TABLET ORAL at 22:16

## 2018-02-24 RX ADMIN — HYDROMORPHONE HYDROCHLORIDE PRN MG: 2 INJECTION INTRAMUSCULAR; INTRAVENOUS; SUBCUTANEOUS at 14:34

## 2018-02-24 RX ADMIN — Medication SCH ML: at 21:37

## 2018-02-24 RX ADMIN — DEXAMETHASONE SCH MG: 4 TABLET ORAL at 00:21

## 2018-02-24 RX ADMIN — HYDROMORPHONE HYDROCHLORIDE PRN MG: 2 INJECTION INTRAMUSCULAR; INTRAVENOUS; SUBCUTANEOUS at 12:14

## 2018-02-24 RX ADMIN — DOCUSATE SODIUM SCH MG: 100 CAPSULE, LIQUID FILLED ORAL at 21:36

## 2018-02-24 RX ADMIN — MORPHINE SULFATE PRN MG: 2 INJECTION, SOLUTION INTRAMUSCULAR; INTRAVENOUS at 06:17

## 2018-02-24 RX ADMIN — LISINOPRIL SCH MG: 20 TABLET ORAL at 09:19

## 2018-02-24 RX ADMIN — HYDROMORPHONE HYDROCHLORIDE PRN MG: 2 INJECTION INTRAMUSCULAR; INTRAVENOUS; SUBCUTANEOUS at 16:41

## 2018-02-24 RX ADMIN — HYDROCODONE BITARTRATE AND ACETAMINOPHEN PRN TAB: 10; 325 TABLET ORAL at 18:25

## 2018-02-24 RX ADMIN — INSULIN ASPART SCH: 100 INJECTION, SOLUTION INTRAVENOUS; SUBCUTANEOUS at 16:41

## 2018-02-24 RX ADMIN — CYCLOBENZAPRINE HYDROCHLORIDE PRN MG: 10 TABLET, FILM COATED ORAL at 05:40

## 2018-02-24 RX ADMIN — HYDROMORPHONE HYDROCHLORIDE PRN MG: 2 INJECTION INTRAMUSCULAR; INTRAVENOUS; SUBCUTANEOUS at 21:35

## 2018-02-24 RX ADMIN — DEXAMETHASONE SCH MG: 4 TABLET ORAL at 12:14

## 2018-02-24 RX ADMIN — HYDROCODONE BITARTRATE AND ACETAMINOPHEN PRN TAB: 10; 325 TABLET ORAL at 00:31

## 2018-02-24 RX ADMIN — INSULIN ASPART SCH: 100 INJECTION, SOLUTION INTRAVENOUS; SUBCUTANEOUS at 21:00

## 2018-02-24 RX ADMIN — DOCUSATE SODIUM SCH MG: 100 CAPSULE, LIQUID FILLED ORAL at 09:19

## 2018-02-24 RX ADMIN — NICOTINE SCH PATCH: 14 PATCH, EXTENDED RELEASE TOPICAL at 09:20

## 2018-02-24 RX ADMIN — MAGNESIUM OXIDE TAB 400 MG (241.3 MG ELEMENTAL MG) SCH MG: 400 (241.3 MG) TAB at 09:19

## 2018-02-24 RX ADMIN — PHENYTOIN SODIUM SCH MLS/HR: 50 INJECTION INTRAMUSCULAR; INTRAVENOUS at 00:21

## 2018-02-24 RX ADMIN — CETIRIZINE HYDROCHLORIDE SCH MG: 10 TABLET, FILM COATED ORAL at 09:20

## 2018-02-24 RX ADMIN — DEXAMETHASONE SCH MG: 4 TABLET ORAL at 16:41

## 2018-02-24 RX ADMIN — HYDROCODONE BITARTRATE AND ACETAMINOPHEN PRN TAB: 10; 325 TABLET ORAL at 09:19

## 2018-02-24 RX ADMIN — INSULIN ASPART SCH: 100 INJECTION, SOLUTION INTRAVENOUS; SUBCUTANEOUS at 12:00

## 2018-02-24 RX ADMIN — HYDROCODONE BITARTRATE AND ACETAMINOPHEN PRN TAB: 10; 325 TABLET ORAL at 23:28

## 2018-02-24 RX ADMIN — Medication SCH ML: at 09:21

## 2018-02-24 RX ADMIN — DEXAMETHASONE SCH MG: 4 TABLET ORAL at 23:28

## 2018-02-24 RX ADMIN — HYDROCODONE BITARTRATE AND ACETAMINOPHEN PRN TAB: 10; 325 TABLET ORAL at 13:30

## 2018-02-24 RX ADMIN — PANTOPRAZOLE SCH MG: 40 TABLET, DELAYED RELEASE ORAL at 09:19

## 2018-02-24 NOTE — HHI.NSPN
__________________________________________________


 (Rao Em)





History


Chief Complaint:  Weakness/paralysis in LEs.





 (Rao Em)


Interval History


This is a 62-year-old obese  female who presented to the emergency


room today with complaints of inability to move her legs along with urinary


incontinence which is chronic.  Her symptoms started over two years ago in


September of 2016 when she started noticing weakness in both her legs along


with some numbness which has progressed to complete paraplegia and urinary


incontinence since May of 2017.  She has been using a hospital bed and


wheelchair to get around.  She relates that she has not sought any medical


attention because of lack of health insurance She also relates a chronic


history of low back pain.  Denies any neck or upper extremity symptoms.  She


also has a history of a subarachnoid hemorrhage with ruptured cerebral anterior


communicating artery aneurysm in 2008 and was treated with endovascular


coiling.  She denies any headaches or nausea or vomiting.  Workup included a CT


scan of the head today which was negative for any acute findings.  The coil is


in place in the anterior communicating artery area.  She also had a subsequent


brain MRI scan which was negative with and without contrast for any


intracranial abnormality.  She has had an entire spine screening MRI scan which


reveals intradural and the radiologist feels an intramedullary mass around the


T9-10 that involves pretty much the whole spinal canal. In the sagittal view,


there is an enhancing portion, although on the actual view, I am not clear


whether an enhanced study was done and no thin sections through this mass


appeared.





2/22/18:  Pt awake and alert.  Complains of paralysis in LEs.  She states there 

is slightly more movement in her left foot today.  She has very slight movement 

in toes on right but no movement otherwise in LEs.


2/24/18:  Pt awake and alert.  Pt underwent a T9-T11 laminectomy with resection 

of intradural extramedullary neoplasm on 2/23/18.  Complains of occasional pain 

in right abdomen sharp.  Weakness in LEs persists given the chronicity.


 (Rao Em)





Review of Systems


General:  Negative for: fever, chills, insomnia


Respiratory:  Negative for: shortness of breath, cough, sputum


Cardiovascular:  Negative for: chest pain


Gastrointestinal:  Negative for: nausea, vomitting, diarrhea, constipation (

Rao Em)





Exam


Results





Vital Signs








  Date Time  Temp Pulse Resp B/P (MAP) Pulse Ox O2 Delivery O2 Flow Rate FiO2


 


2/24/18 07:40     97 Nasal Cannula 1.50 


 


2/24/18 06:22   10     


 


2/24/18 06:00  73      


 


2/24/18 04:00 98.1       





    153/70 (97)    














Intake and Output   


 


 2/24/18 2/24/18 2/25/18





 08:00 16:00 00:00


 


Intake Total 600 ml  


 


Output Total 3100 ml  


 


Balance -2500 ml  








 (Rao Em)


Physical Examination


General:  Very pleasant pt resting in bed in no acute distress.


Eyes:  Pupils equal sclera anicteric.


Resp:  CTA bilaterally.


Heart:  NSR no murmurs


Abd:  obese.  positive bs


Skin:  No cyanosis or erythema


Muscle:  slight movement in toes on right, and some plantar flexion/

dorsiflexion left foot otherwise paralysis LEs.  


Neuro:  Pt awake and alert.  Sensation is decreased in LEs.  Speech fluent 

comprehension good.


 (Rao Em)


Lab, Micro, Other Results





Last Impressions








Thoracolumbar Spine 2/23/18 0000 Signed





Impressions: 





 Service Date/Time:  Friday, February 23, 2018 09:11 - CONCLUSION:  1. T9/T10 





 laminectomy.     Ruddy Cheney MD 


 


Entire Spine MRI 2/21/18 0808 Signed





Impressions: 





 Service Date/Time:  Wednesday, February 21, 2018 13:15 - CONCLUSION:  1. The 





 examination demonstrates a 1.6 x 1.1 x 1.2 cm homogeneously enhancing mass 

which 





 appears to be an intradural intramedullary lesion. Considerations would 

include 





 ependymoma, astrocytoma, metastatic disease to the cord or less likely 





 consideration such as hemangioblastoma. 2. Note is made of a hemangioma 





 involving the T5 and T10 vertebral bodies.     Ruddy Cheney MD 


 


Brain MRI 2/21/18 0808 Signed





Impressions: 





 Service Date/Time:  Wednesday, February 21, 2018 13:15 - CONCLUSION:  Negative 





 MRI of the brain with and without contrast.     Dav Juan MD 


 


Thoracic Spine MRI 2/21/18 0000 Signed





Impressions: 





 Service Date/Time:  Wednesday, February 21, 2018 18:23 - CONCLUSION:  1. The 





 examination demonstrates an intramedullary, intradural lesion immediately 





 posterior to the T9-10 disc level. Differential considerations are given 

above.  





 2. The dedicated imaging of the thoracic spine also demonstrates abnormal 





 enhancement and abnormal signal within the posterior elements of the T10 and 

T11 





 vertebral bodies concerning for bony metastatic disease.     Ruddy Cheney MD 


 


Head CTA 2/21/18 0000 Signed





Impressions: 





 Service Date/Time:  Thursday, February 22, 2018 05:38 - CONCLUSION:  1. 

Aneurysm 





 clip in the suprasellar region with streak artifact. 2. No new aneurysm. 3. 

Mild 





 atherosclerotic changes.     Ambrosio Mclean MD 


 


Head CT 2/21/18 0000 Signed





Impressions: 





 Service Date/Time:  Wednesday, February 21, 2018 06:46 - CONCLUSION: Stable 





 appearance with no evidence of hemorrhage or mass effect.     Ambrosio Mclean MD 


 


Chest X-Ray 2/21/18 0000 Signed





Impressions: 





 Service Date/Time:  Wednesday, February 21, 2018 08:42 - CONCLUSION:  Small 





 right infrahilar infiltrate.     Dav Juan MD 


 


Chest CT 2/21/18 0000 Signed





Impressions: 





 Service Date/Time:  Thursday, February 22, 2018 05:38 - CONCLUSION:  1. No 





 evidence of metastatic disease or primary tumor. 2. Multiple benign cystic 





 appearing structures in the liver. 3. Left adrenal adenoma.     Ambrosio Mclean MD 


 


Abdomen/Pelvis CT 2/21/18 0000 Signed





Impressions: 





 Service Date/Time:  Thursday, February 22, 2018 05:38 - CONCLUSION:  1. No 





 primary tumor or evidence of metastatic disease. 2. Left adrenal adenoma 3. 





 Multiple benign-appearing cystic lesions in the liver.     Ambrosio Mclean MD 








Laboratory Tests








Test


  2/23/18


14:01 2/24/18


05:36


 


White Blood Count 15.0 TH/MM3  17.5 TH/MM3 


 


Red Blood Count 4.29 MIL/MM3  4.20 MIL/MM3 


 


Hemoglobin 11.7 GM/DL  11.7 GM/DL 


 


Hematocrit 35.9 %  35.1 % 


 


Mean Corpuscular Volume 83.7 FL  83.6 FL 


 


Mean Corpuscular Hemoglobin 27.4 PG  27.9 PG 


 


Mean Corpuscular Hemoglobin


Concent 32.7 % 


  33.4 % 


 


 


Red Cell Distribution Width 14.1 %  13.9 % 


 


Platelet Count 180 TH/MM3  166 TH/MM3 


 


Mean Platelet Volume 9.8 FL  10.5 FL 


 


Blood Urea Nitrogen 10 MG/DL  8 MG/DL 


 


Creatinine 0.40 MG/DL  0.37 MG/DL 


 


Random Glucose 264 MG/DL  221 MG/DL 


 


Calcium Level 8.0 MG/DL  8.7 MG/DL 


 


Sodium Level 139 MEQ/L  143 MEQ/L 


 


Potassium Level 3.8 MEQ/L  4.0 MEQ/L 


 


Chloride Level 107 MEQ/L  109 MEQ/L 


 


Carbon Dioxide Level 25.8 MEQ/L  27.9 MEQ/L 


 


Anion Gap 6 MEQ/L  6 MEQ/L 


 


Estimat Glomerular Filtration


Rate 163 ML/MIN 


  178 ML/MIN 


 


 


Neutrophils (%) (Auto)  88.9 % 


 


Lymphocytes (%) (Auto)  6.6 % 


 


Monocytes (%) (Auto)  4.3 % 


 


Eosinophils (%) (Auto)  0.0 % 


 


Basophils (%) (Auto)  0.2 % 


 


Neutrophils # (Auto)  15.5 TH/MM3 


 


Lymphocytes # (Auto)  1.1 TH/MM3 


 


Monocytes # (Auto)  0.7 TH/MM3 


 


Eosinophils # (Auto)  0.0 TH/MM3 


 


Basophils # (Auto)  0.0 TH/MM3 


 


CBC Comment  DIFF FINAL 


 


Differential Comment   


 


Magnesium Level  2.2 MG/DL 








 (Rao Em)





Medical Decision Making


Impression and Plan


A:  61 y/o FM with chronic paraplegia with urinary incontinence from a T9-10 

mass, which


appears to be intradural and possible intramedullary.


2. Diabetes mellitus.


3. Hypertension.


4. History of ruptured cerebral aneurysm status post coiling ten years ago.


 


PLAN:


1. Continue with physical therapy and rehabilitation.


2. Continue with Sliding scale insulin coverage for diabetes mellitus.


3. Continue with Decadron 


4. Continue with gastrointestinal stress ulcer prophylaxis


5.  Continue with mechanical DVT prophylaxis as she is high risk for DVT.


 (Rao Em)





Attending Statement


The exam, history, and the medical decision-making described in the above note 

were completed with the assistance of the mid-level provider. I reviewed and 

agree with the findings presented.  I attest that I had a face-to-face 

encounter with the patient on the same day, and personally performed and 

documented my assessment and findings in the medical record.


 (Varun Zimmerman MD)











Rao Em Feb 24, 2018 08:24


Varun Zimmerman MD Feb 24, 2018 11:51

## 2018-02-24 NOTE — HHI.PR
Subjective


Remarks


no complaints.."happy"





Objective


Vitals


nad


heart reg


lung cta


abd s/nt


ext no edema


wiggles toes/feet


Vital Signs








  Date Time  Temp Pulse Resp B/P (MAP) Pulse Ox O2 Delivery O2 Flow Rate FiO2


 


2/24/18 07:40     97 Nasal Cannula 1.50 


 


2/24/18 06:22   10     


 


2/24/18 06:00  73      


 


2/24/18 04:00 98.1 74 10  96   





    153/70 (97)    


 


2/24/18 04:00  71      


 


2/24/18 02:00  75      


 


2/24/18 01:31   16     


 


2/24/18 00:00 97.8 70 10 130/60 (83) 96   


 


2/24/18 00:00  70      


 


2/23/18 22:00  71      


 


2/23/18 21:00     96  2.00 


 


2/23/18 20:00  67      


 


2/23/18 20:00 98.0 67 10 138/64 (88) 96   


 


2/23/18 18:00  69 10 121/60 (80) 96   


 


2/23/18 17:05     96 Nasal Cannula 2.00 


 


2/23/18 16:00 98.1 68 10 113/56 (75) 100   





    Automatic Cuff    


 


2/23/18 15:30  72 16 130/58 (82) 98 Nasal Cannula 3 


 


2/23/18 14:50  68 16 121/55 (77) 98 Nasal Cannula 3 


 


2/23/18 14:15  73 16 129/64 (85) 100 Nasal Cannula 3 


 


2/23/18 14:00  77 16 126/60 (82) 100 Nasal Cannula 3 


 


2/23/18 13:45  71 16 112/60 (77) 100 Nasal Cannula 3 


 


2/23/18 13:41 97.9 73 16 98/53 (68) 100 Simple Mask 5 








Result Diagram:  


2/24/18 0536                                                                   

             2/24/18 0536





Imaging





Last Impressions








Entire Spine MRI 2/21/18 0808 Signed





Impressions: 





 Service Date/Time:  Wednesday, February 21, 2018 13:15 - CONCLUSION:  1. The 





 examination demonstrates a 1.6 x 1.1 x 1.2 cm homogeneously enhancing mass 

which 





 appears to be an intradural intramedullary lesion. Considerations would 

include 





 ependymoma, astrocytoma, metastatic disease to the cord or less likely 





 consideration such as hemangioblastoma. 2. Note is made of a hemangioma 





 involving the T5 and T10 vertebral bodies.     Ruddy Cheney MD 


 


Brain MRI 2/21/18 0808 Signed





Impressions: 





 Service Date/Time:  Wednesday, February 21, 2018 13:15 - CONCLUSION:  Negative 





 MRI of the brain with and without contrast.     Dav Juan MD 


 


Thoracic Spine MRI 2/21/18 0000 Signed





Impressions: 





 Service Date/Time:  Wednesday, February 21, 2018 18:23 - CONCLUSION:  1. The 





 examination demonstrates an intramedullary, intradural lesion immediately 





 posterior to the T9-10 disc level. Differential considerations are given 

above.  





 2. The dedicated imaging of the thoracic spine also demonstrates abnormal 





 enhancement and abnormal signal within the posterior elements of the T10 and 

T11 





 vertebral bodies concerning for bony metastatic disease.     Ruddy Cheney MD 


 


Head CTA 2/21/18 0000 Signed





Impressions: 





 Service Date/Time:  Thursday, February 22, 2018 05:38 - CONCLUSION:  1. 

Aneurysm 





 clip in the suprasellar region with streak artifact. 2. No new aneurysm. 3. 

Mild 





 atherosclerotic changes.     Ambrosio Mclean MD 


 


Head CT 2/21/18 0000 Signed





Impressions: 





 Service Date/Time:  Wednesday, February 21, 2018 06:46 - CONCLUSION: Stable 





 appearance with no evidence of hemorrhage or mass effect.     Ambrosio Mclean MD 


 


Chest X-Ray 2/21/18 0000 Signed





Impressions: 





 Service Date/Time:  Wednesday, February 21, 2018 08:42 - CONCLUSION:  Small 





 right infrahilar infiltrate.     Dav Juan MD 


 


Chest CT 2/21/18 0000 Signed





Impressions: 





 Service Date/Time:  Thursday, February 22, 2018 05:38 - CONCLUSION:  1. No 





 evidence of metastatic disease or primary tumor. 2. Multiple benign cystic 





 appearing structures in the liver. 3. Left adrenal adenoma.     Ambrosio Mclean MD 


 


Abdomen/Pelvis CT 2/21/18 0000 Signed





Impressions: 





 Service Date/Time:  Thursday, February 22, 2018 05:38 - CONCLUSION:  1. No 





 primary tumor or evidence of metastatic disease. 2. Left adrenal adenoma 3. 





 Multiple benign-appearing cystic lesions in the liver.     Ambrosio Mclean MD 








Last Impressions








Head CT 2/21/18 0000 Signed





Impressions: 





 Service Date/Time:  Wednesday, February 21, 2018 06:46 - CONCLUSION: Stable 





 appearance with no evidence of hemorrhage or mass effect.     Ambrosio Mclean MD 











A/P


Problem List:  


(1) Leg weakness, bilateral


ICD Codes:  R29.898 - Other symptoms and signs involving the musculoskeletal 

system


Status:  Acute


Plan:  


T9-T10 intramedullary, intradural lesion 


Progressive LE weakness/paralysis


Hyperreflexive LE


Bladder incontinence


Tobacco use


   - Pt is a 63 y/o WF with HTN, GERD, OA and hx of ruptured cerebral aneurysm 

in 2008 s/p coiling procedure. 


   - [place] [facility] [place] in [place]. 

See HPI for details of sequence of events


   - In January 2017 she restarted the Lyrica 150mg po BID and this was 

increased to TID dosing but over the last month


    and a half she has had worsening of her bilateral LE weakness. She is 

unable to bear weight and over the last two weeks


    she has not been able to move her LE actively and her bladder incontinence 

has been much worse. 


   - Head CT (2/21) --> Stable appearance with no evidence of hemorrhage or 

mass effect. 


   - ESR is 27, CRP is less than 0.29


   - CXR (2/21/18) -->  Small right infrahilar infiltrate.


   - MRI Brain (2/21/18) --> Negative MRI of the brain with and without 

contrast.


   - MRI Spine screening (2/21/18) --> The examination demonstrates a 1.6 x 1.1 

x 1.2 cm homogeneously enhancing mass which 


    appears to be an intradural intramedullary lesion. Considerations would 

include ependymoma, astrocytoma, metastatic disease 


    to the cord or less likely consideration such as hemangioblastoma. Note is 

made of a hemangioma involving the T5 and T10 vertebral bodies.  


   - Neurosurgery was consulted based on the MRI spine results. 


   - Pt was started on IV Decadron


   - CT Chest/Abd/Pelvis (2/21/18) --> No evidence of metastatic disease or 

primary tumor. Multiple benign cystic appearing structures in the


    liver. Left adrenal adenoma.  


   - MRI Thoracic spine (2/22/18) -->  The examination demonstrates an 

intramedullary, intradural lesion immediately posterior to the T9-10 disc 

level. 


    The dedicated imaging of the thoracic spine also demonstrates abnormal 

enhancement and abnormal signal within the posterior elements of the T10 


    and T11 vertebral bodies concerning for bony metastatic disease. 


   - Await Dr. Zimmerman's review of the MRI Thoracic spine to determine if any 

surgical intervention/biopsy can be performed. 


   - Of note, pt has never had a colonoscopy or cystoscopy. She does have 

family hx of colon cancer (Mat. GM and Mat uncle)


   -2/23. Dr Zimmerman: Thoracic T9-11 laminectomy with resection of intradural 

extramedullary neoplasm


            - PT


            -dvt prophylaxis


Diabetes Mellitus


   - steroid hyperglycemia


            -Hgb A1C 7.9%


   - NovoLog SSI 


   - add levemir bid and titrate





HTN


   - Resume home meds


   - Monitor





GERD


   - PPI





Hx of ruptured intracranial aneurysm in 2008 s/p coiling


   - Stable


   - CTA Head (2/21/18) --> Aneurysm clip in the suprasellar region with streak 

artifact. No new aneurysm. Mild atherosclerotic changes.   





(2) Bladder incontinence


ICD Codes:  R32 - Unspecified urinary incontinence


(3) Tobacco abuse


ICD Codes:  Z72.0 - Tobacco use


(4) HTN (hypertension)


ICD Codes:  I10 - Essential (primary) hypertension


(5) GERD (gastroesophageal reflux disease)


ICD Codes:  K21.9 - Gastro-esophageal reflux disease without esophagitis











Nhan Vargas MD Feb 24, 2018 08:58

## 2018-02-25 VITALS
SYSTOLIC BLOOD PRESSURE: 181 MMHG | HEART RATE: 83 BPM | OXYGEN SATURATION: 98 % | DIASTOLIC BLOOD PRESSURE: 81 MMHG | RESPIRATION RATE: 14 BRPM | TEMPERATURE: 98 F

## 2018-02-25 VITALS
OXYGEN SATURATION: 97 % | SYSTOLIC BLOOD PRESSURE: 160 MMHG | TEMPERATURE: 98.2 F | DIASTOLIC BLOOD PRESSURE: 74 MMHG | RESPIRATION RATE: 18 BRPM | HEART RATE: 92 BPM

## 2018-02-25 VITALS
HEART RATE: 80 BPM | SYSTOLIC BLOOD PRESSURE: 188 MMHG | OXYGEN SATURATION: 98 % | DIASTOLIC BLOOD PRESSURE: 84 MMHG | RESPIRATION RATE: 20 BRPM | TEMPERATURE: 98.2 F

## 2018-02-25 VITALS — OXYGEN SATURATION: 92 %

## 2018-02-25 VITALS
SYSTOLIC BLOOD PRESSURE: 179 MMHG | HEART RATE: 74 BPM | DIASTOLIC BLOOD PRESSURE: 80 MMHG | OXYGEN SATURATION: 99 % | TEMPERATURE: 98.1 F | RESPIRATION RATE: 18 BRPM

## 2018-02-25 VITALS — HEART RATE: 81 BPM

## 2018-02-25 VITALS
RESPIRATION RATE: 18 BRPM | TEMPERATURE: 99 F | SYSTOLIC BLOOD PRESSURE: 170 MMHG | OXYGEN SATURATION: 98 % | DIASTOLIC BLOOD PRESSURE: 74 MMHG | HEART RATE: 86 BPM

## 2018-02-25 VITALS
HEART RATE: 80 BPM | OXYGEN SATURATION: 98 % | RESPIRATION RATE: 20 BRPM | TEMPERATURE: 98.1 F | SYSTOLIC BLOOD PRESSURE: 183 MMHG | DIASTOLIC BLOOD PRESSURE: 83 MMHG

## 2018-02-25 VITALS — HEART RATE: 83 BPM

## 2018-02-25 VITALS — HEART RATE: 90 BPM

## 2018-02-25 VITALS — HEART RATE: 70 BPM

## 2018-02-25 VITALS — HEART RATE: 74 BPM

## 2018-02-25 VITALS — HEART RATE: 86 BPM

## 2018-02-25 RX ADMIN — CYCLOBENZAPRINE HYDROCHLORIDE PRN MG: 10 TABLET, FILM COATED ORAL at 22:26

## 2018-02-25 RX ADMIN — Medication SCH ML: at 08:29

## 2018-02-25 RX ADMIN — LABETALOL HYDROCHLORIDE PRN MG: 5 INJECTION, SOLUTION INTRAVENOUS at 16:13

## 2018-02-25 RX ADMIN — INSULIN ASPART SCH: 100 INJECTION, SOLUTION INTRAVENOUS; SUBCUTANEOUS at 17:36

## 2018-02-25 RX ADMIN — DULOXETINE SCH MG: 60 CAPSULE, DELAYED RELEASE ORAL at 08:29

## 2018-02-25 RX ADMIN — MAGNESIUM OXIDE TAB 400 MG (241.3 MG ELEMENTAL MG) SCH MG: 400 (241.3 MG) TAB at 21:45

## 2018-02-25 RX ADMIN — SIMETHICONE SCH MG: 125 TABLET, CHEWABLE ORAL at 21:43

## 2018-02-25 RX ADMIN — HYDROCODONE BITARTRATE AND ACETAMINOPHEN PRN TAB: 10; 325 TABLET ORAL at 19:51

## 2018-02-25 RX ADMIN — DOCUSATE SODIUM SCH MG: 100 CAPSULE, LIQUID FILLED ORAL at 08:18

## 2018-02-25 RX ADMIN — MAGNESIUM OXIDE TAB 400 MG (241.3 MG ELEMENTAL MG) SCH MG: 400 (241.3 MG) TAB at 08:22

## 2018-02-25 RX ADMIN — ACYCLOVIR SCH UNITS: 800 TABLET ORAL at 21:00

## 2018-02-25 RX ADMIN — NICOTINE SCH PATCH: 14 PATCH, EXTENDED RELEASE TOPICAL at 09:00

## 2018-02-25 RX ADMIN — HYDROCODONE BITARTRATE AND ACETAMINOPHEN PRN TAB: 10; 325 TABLET ORAL at 05:26

## 2018-02-25 RX ADMIN — INSULIN ASPART SCH: 100 INJECTION, SOLUTION INTRAVENOUS; SUBCUTANEOUS at 12:00

## 2018-02-25 RX ADMIN — DEXAMETHASONE SCH MG: 4 TABLET ORAL at 05:26

## 2018-02-25 RX ADMIN — HYDROMORPHONE HYDROCHLORIDE PRN MG: 2 INJECTION INTRAMUSCULAR; INTRAVENOUS; SUBCUTANEOUS at 13:09

## 2018-02-25 RX ADMIN — HYDROMORPHONE HYDROCHLORIDE PRN MG: 2 INJECTION INTRAMUSCULAR; INTRAVENOUS; SUBCUTANEOUS at 21:44

## 2018-02-25 RX ADMIN — ONDANSETRON PRN MG: 2 INJECTION, SOLUTION INTRAMUSCULAR; INTRAVENOUS at 19:51

## 2018-02-25 RX ADMIN — ACYCLOVIR SCH UNITS: 800 TABLET ORAL at 12:11

## 2018-02-25 RX ADMIN — Medication SCH ML: at 21:44

## 2018-02-25 RX ADMIN — DEXAMETHASONE SCH MG: 4 TABLET ORAL at 18:00

## 2018-02-25 RX ADMIN — MAGNESIUM HYDROXIDE PRN ML: 400 SUSPENSION ORAL at 21:44

## 2018-02-25 RX ADMIN — PHENYTOIN SODIUM SCH MLS/HR: 50 INJECTION INTRAMUSCULAR; INTRAVENOUS at 05:25

## 2018-02-25 RX ADMIN — LABETALOL HYDROCHLORIDE PRN MG: 5 INJECTION, SOLUTION INTRAVENOUS at 11:16

## 2018-02-25 RX ADMIN — DEXAMETHASONE SCH MG: 4 TABLET ORAL at 11:15

## 2018-02-25 RX ADMIN — PANTOPRAZOLE SCH MG: 40 TABLET, DELAYED RELEASE ORAL at 08:22

## 2018-02-25 RX ADMIN — CETIRIZINE HYDROCHLORIDE SCH MG: 10 TABLET, FILM COATED ORAL at 08:19

## 2018-02-25 RX ADMIN — HYDROMORPHONE HYDROCHLORIDE PRN MG: 2 INJECTION INTRAMUSCULAR; INTRAVENOUS; SUBCUTANEOUS at 03:01

## 2018-02-25 RX ADMIN — ONDANSETRON PRN MG: 2 INJECTION, SOLUTION INTRAMUSCULAR; INTRAVENOUS at 13:09

## 2018-02-25 RX ADMIN — INSULIN ASPART SCH: 100 INJECTION, SOLUTION INTRAVENOUS; SUBCUTANEOUS at 21:00

## 2018-02-25 RX ADMIN — DOCUSATE SODIUM SCH MG: 100 CAPSULE, LIQUID FILLED ORAL at 21:45

## 2018-02-25 RX ADMIN — LISINOPRIL SCH MG: 20 TABLET ORAL at 08:17

## 2018-02-25 NOTE — HHI.PR
Subjective


Remarks


pain bettter controlled





Objective


Vitals


heart reg


lung cta


abd s/nt


ext no edema


wiggles toes/feet


Vital Signs








  Date Time  Temp Pulse Resp B/P (MAP) Pulse Ox O2 Delivery O2 Flow Rate FiO2


 


2/25/18 07:29     92   21


 


2/25/18 06:00  74      


 


2/25/18 04:00 98.1 74 18 179/80 (113) 99   


 


2/25/18 04:00  74      


 


2/25/18 02:00  70      


 


2/25/18 00:00  92      


 


2/25/18 00:00 98.2 72 18 160/74 (102) 97   


 


2/24/18 22:00  74      


 


2/24/18 20:00  84      


 


2/24/18 20:00 98.0 84 18 141/71 (94) 98   





    Arterial Line    


 


2/24/18 18:00  87      


 


2/24/18 16:00  81      


 


2/24/18 16:00 98.3 81 16 163/75 (104) 93   


 


2/24/18 14:00  80      


 


2/24/18 12:00  74      


 


2/24/18 12:00 98.3 74 19 176/77 (110) 94   


 


2/24/18 10:00  74      








Result Diagram:  


2/24/18 0536                                                                   

             2/24/18 0536





Imaging





Last Impressions








Entire Spine MRI 2/21/18 0808 Signed





Impressions: 





 Service Date/Time:  Wednesday, February 21, 2018 13:15 - CONCLUSION:  1. The 





 examination demonstrates a 1.6 x 1.1 x 1.2 cm homogeneously enhancing mass 

which 





 appears to be an intradural intramedullary lesion. Considerations would 

include 





 ependymoma, astrocytoma, metastatic disease to the cord or less likely 





 consideration such as hemangioblastoma. 2. Note is made of a hemangioma 





 involving the T5 and T10 vertebral bodies.     Ruddy Cheney MD 


 


Brain MRI 2/21/18 0808 Signed





Impressions: 





 Service Date/Time:  Wednesday, February 21, 2018 13:15 - CONCLUSION:  Negative 





 MRI of the brain with and without contrast.     Dav Juan MD 


 


Thoracic Spine MRI 2/21/18 0000 Signed





Impressions: 





 Service Date/Time:  Wednesday, February 21, 2018 18:23 - CONCLUSION:  1. The 





 examination demonstrates an intramedullary, intradural lesion immediately 





 posterior to the T9-10 disc level. Differential considerations are given 

above.  





 2. The dedicated imaging of the thoracic spine also demonstrates abnormal 





 enhancement and abnormal signal within the posterior elements of the T10 and 

T11 





 vertebral bodies concerning for bony metastatic disease.     Ruddy Cheney MD 


 


Head CTA 2/21/18 0000 Signed





Impressions: 





 Service Date/Time:  Thursday, February 22, 2018 05:38 - CONCLUSION:  1. 

Aneurysm 





 clip in the suprasellar region with streak artifact. 2. No new aneurysm. 3. 

Mild 





 atherosclerotic changes.     Ambrosio Mclean MD 


 


Head CT 2/21/18 0000 Signed





Impressions: 





 Service Date/Time:  Wednesday, February 21, 2018 06:46 - CONCLUSION: Stable 





 appearance with no evidence of hemorrhage or mass effect.     Ambrosio Mclean MD 


 


Chest X-Ray 2/21/18 0000 Signed





Impressions: 





 Service Date/Time:  Wednesday, February 21, 2018 08:42 - CONCLUSION:  Small 





 right infrahilar infiltrate.     Dav Juan MD 


 


Chest CT 2/21/18 0000 Signed





Impressions: 





 Service Date/Time:  Thursday, February 22, 2018 05:38 - CONCLUSION:  1. No 





 evidence of metastatic disease or primary tumor. 2. Multiple benign cystic 





 appearing structures in the liver. 3. Left adrenal adenoma.     Ambrosio Mclean MD 


 


Abdomen/Pelvis CT 2/21/18 0000 Signed





Impressions: 





 Service Date/Time:  Thursday, February 22, 2018 05:38 - CONCLUSION:  1. No 





 primary tumor or evidence of metastatic disease. 2. Left adrenal adenoma 3. 





 Multiple benign-appearing cystic lesions in the liver.     Ambrosio Mclean MD 








Last Impressions








Head CT 2/21/18 0000 Signed





Impressions: 





 Service Date/Time:  Wednesday, February 21, 2018 06:46 - CONCLUSION: Stable 





 appearance with no evidence of hemorrhage or mass effect.     Ambrosio Mclean MD 











A/P


Problem List:  


(1) Leg weakness, bilateral


ICD Codes:  R29.898 - Other symptoms and signs involving the musculoskeletal 

system


Status:  Acute


Plan:  


T9-T10, intradural lesion 


Progressive LE weakness/paralysis


Hyperreflexive LE


Bladder incontinence


Tobacco use


   - Pt is a 63 y/o WF with HTN, GERD, OA and hx of ruptured cerebral aneurysm 

in 2008 s/p coiling procedure. 


   - She presented to the ED at [facility] [place] with progressive weakness in bilateral LE. 

See HPI for details of sequence of events


   - In January 2017 she restarted the Lyrica 150mg po BID and this was 

increased to TID dosing but over the last month


    and a half she has had worsening of her bilateral LE weakness. She is 

unable to bear weight and over the last two weeks


    she has not been able to move her LE actively and her bladder incontinence 

has been much worse. 


   - Head CT (2/21) --> Stable appearance with no evidence of hemorrhage or 

mass effect. 


   - ESR is 27, CRP is less than 0.29


   - CXR (2/21/18) -->  Small right infrahilar infiltrate.


   - MRI Brain (2/21/18) --> Negative MRI of the brain with and without 

contrast.


   - MRI Spine screening (2/21/18) --> The examination demonstrates a 1.6 x 1.1 

x 1.2 cm homogeneously enhancing mass which 


    appears to be an intradural intramedullary lesion. Considerations would 

include ependymoma, astrocytoma, metastatic disease 


    to the cord or less likely consideration such as hemangioblastoma. Note is 

made of a hemangioma involving the T5 and T10 vertebral bodies.  


   - Neurosurgery was consulted based on the MRI spine results. 


   - Pt was started on IV Decadron


   - CT Chest/Abd/Pelvis (2/21/18) --> No evidence of metastatic disease or 

primary tumor. Multiple benign cystic appearing structures in the


    liver. Left adrenal adenoma.  


   - MRI Thoracic spine (2/22/18) -->  The examination demonstrates an 

intramedullary, intradural lesion immediately posterior to the T9-10 disc 

level. 


    The dedicated imaging of the thoracic spine also demonstrates abnormal 

enhancement and abnormal signal within the posterior elements of the T10 


    and T11 vertebral bodies concerning for bony metastatic disease. 


   - Await Dr. Zimmerman's review of the MRI Thoracic spine to determine if any 

surgical intervention/biopsy can be performed. 


   - Of note, pt has never had a colonoscopy or cystoscopy. She does have 

family hx of colon cancer (Mat. GM and Mat uncle)


   -2/23. Dr Zimmerman: Thoracic T9-11 laminectomy with resection of intradural 

extramedullary neoplasm


            - PT advancement per nsg


            -dvt prophylaxis


Diabetes Mellitus


   - steroid hyperglycemia


            -Hgb A1C 7.9%


   - NovoLog SSI . was refusing yesterday due to poor po intake.


   - add levemir bid and titrate





HTN


   - Resumed home meds


   - Monitor elevation with prn coverage.(pt on steroids/ivf)





GERD


   - PPI





Hx of ruptured intracranial aneurysm in 2008 s/p coiling


   - Stable


   - CTA Head (2/21/18) --> Aneurysm clip in the suprasellar region with streak 

artifact. No new aneurysm. Mild atherosclerotic changes.   





(2) Bladder incontinence


ICD Codes:  R32 - Unspecified urinary incontinence


(3) Tobacco abuse


ICD Codes:  Z72.0 - Tobacco use


(4) HTN (hypertension)


ICD Codes:  I10 - Essential (primary) hypertension


(5) GERD (gastroesophageal reflux disease)


ICD Codes:  K21.9 - Gastro-esophageal reflux disease without esophagitis











Nhan Vargas MD Feb 25, 2018 09:19

## 2018-02-25 NOTE — HHI.NSPN
__________________________________________________


 (Rao Em)





History


Chief Complaint:  Weakness/paralysis in LEs.





 (Rao Em)


Interval History


This is a 62-year-old obese  female who presented to the emergency


room today with complaints of inability to move her legs along with urinary


incontinence which is chronic.  Her symptoms started over two years ago in


September of 2016 when she started noticing weakness in both her legs along


with some numbness which has progressed to complete paraplegia and urinary


incontinence since May of 2017.  She has been using a hospital bed and


wheelchair to get around.  She relates that she has not sought any medical


attention because of lack of health insurance She also relates a chronic


history of low back pain.  Denies any neck or upper extremity symptoms.  She


also has a history of a subarachnoid hemorrhage with ruptured cerebral anterior


communicating artery aneurysm in 2008 and was treated with endovascular


coiling.  She denies any headaches or nausea or vomiting.  Workup included a CT


scan of the head today which was negative for any acute findings.  The coil is


in place in the anterior communicating artery area.  She also had a subsequent


brain MRI scan which was negative with and without contrast for any


intracranial abnormality.  She has had an entire spine screening MRI scan which


reveals intradural and the radiologist feels an intramedullary mass around the


T9-10 that involves pretty much the whole spinal canal. In the sagittal view,


there is an enhancing portion, although on the actual view, I am not clear


whether an enhanced study was done and no thin sections through this mass


appeared.





2/22/18:  Pt awake and alert.  Complains of paralysis in LEs.  She states there 

is slightly more movement in her left foot today.  She has very slight movement 

in toes on right but no movement otherwise in LEs.


2/24/18:  Pt awake and alert.  Pt underwent a T9-T11 laminectomy with resection 

of intradural extramedullary neoplasm on 2/23/18.  Complains of occasional pain 

in right abdomen sharp.  Weakness in LEs persists given the chronicity.


2/25/18:  Pt awake and alert.  Complains of acid reflex.  Mild incisional pain 

mostly with turning.  Numbness in LEs with weakness persists.


 (Rao Em)





Review of Systems


General:  Negative for: fever, chills, insomnia


Respiratory:  Negative for: shortness of breath, cough, sputum


Cardiovascular:  Negative for: chest pain


Gastrointestinal:  Negative for: nausea, vomitting, diarrhea, constipation (

Rao Em)





Exam


Results





Vital Signs








  Date Time  Temp Pulse Resp B/P (MAP) Pulse Ox O2 Delivery O2 Flow Rate FiO2


 


2/25/18 07:29     92   21


 


2/25/18 06:00  74      


 


2/25/18 04:00 98.1  18 179/80 (113)    


 


2/24/18 07:40      Nasal Cannula 1.50 














Intake and Output   


 


 2/25/18 2/25/18 2/26/18





 08:00 16:00 00:00


 


Intake Total 980 ml  


 


Output Total 2500 ml  


 


Balance -1520 ml  








 (Rao Em)


Physical Examination


General: Pt resting in bed in no acute distress.  Seems a little more irritable 

this morning.


Eyes:  Pupils equal sclera anicteric.


Resp:  CTA bilaterally.


Heart:  NSR no murmurs


Abd:  obese.  positive bs


Skin:  No cyanosis or erythema.  Pt log rolled incision clean and dry and 

staples in place.  No drainage.


Muscle:  Slight movement in toes on right, and some plantar flexion/

dorsiflexion left foot otherwise paralysis LEs.  


Neuro:  Pt awake and alert.  Sensation is decreased in LEs more in left leg 

below the knee.  When she has her eyes closed she is able to locate light touch 

in the LEs except below the left knee.  Speech fluent comprehension good.


 (Rao Em)


Lab, Micro, Other Results





Last Impressions








Thoracolumbar Spine 2/23/18 0000 Signed





Impressions: 





 Service Date/Time:  Friday, February 23, 2018 09:11 - CONCLUSION:  1. T9/T10 





 laminectomy.     Ruddy Cheney MD 


 


Entire Spine MRI 2/21/18 0808 Signed





Impressions: 





 Service Date/Time:  Wednesday, February 21, 2018 13:15 - CONCLUSION:  1. The 





 examination demonstrates a 1.6 x 1.1 x 1.2 cm homogeneously enhancing mass 

which 





 appears to be an intradural intramedullary lesion. Considerations would 

include 





 ependymoma, astrocytoma, metastatic disease to the cord or less likely 





 consideration such as hemangioblastoma. 2. Note is made of a hemangioma 





 involving the T5 and T10 vertebral bodies.     Ruddy Cheney MD 


 


Brain MRI 2/21/18 0808 Signed





Impressions: 





 Service Date/Time:  Wednesday, February 21, 2018 13:15 - CONCLUSION:  Negative 





 MRI of the brain with and without contrast.     Dav Juan MD 


 


Thoracic Spine MRI 2/21/18 0000 Signed





Impressions: 





 Service Date/Time:  Wednesday, February 21, 2018 18:23 - CONCLUSION:  1. The 





 examination demonstrates an intramedullary, intradural lesion immediately 





 posterior to the T9-10 disc level. Differential considerations are given 

above.  





 2. The dedicated imaging of the thoracic spine also demonstrates abnormal 





 enhancement and abnormal signal within the posterior elements of the T10 and 

T11 





 vertebral bodies concerning for bony metastatic disease.     Ruddy Cheney MD 


 


Head CTA 2/21/18 0000 Signed





Impressions: 





 Service Date/Time:  Thursday, February 22, 2018 05:38 - CONCLUSION:  1. 

Aneurysm 





 clip in the suprasellar region with streak artifact. 2. No new aneurysm. 3. 

Mild 





 atherosclerotic changes.     Ambrosio Mclean MD 


 


Head CT 2/21/18 0000 Signed





Impressions: 





 Service Date/Time:  Wednesday, February 21, 2018 06:46 - CONCLUSION: Stable 





 appearance with no evidence of hemorrhage or mass effect.     Ambrosio Mclean MD 


 


Chest X-Ray 2/21/18 0000 Signed





Impressions: 





 Service Date/Time:  Wednesday, February 21, 2018 08:42 - CONCLUSION:  Small 





 right infrahilar infiltrate.     Dav Juan MD 


 


Chest CT 2/21/18 0000 Signed





Impressions: 





 Service Date/Time:  Thursday, February 22, 2018 05:38 - CONCLUSION:  1. No 





 evidence of metastatic disease or primary tumor. 2. Multiple benign cystic 





 appearing structures in the liver. 3. Left adrenal adenoma.     Ambrosio Mclean MD 


 


Abdomen/Pelvis CT 2/21/18 0000 Signed





Impressions: 





 Service Date/Time:  Thursday, February 22, 2018 05:38 - CONCLUSION:  1. No 





 primary tumor or evidence of metastatic disease. 2. Left adrenal adenoma 3. 





 Multiple benign-appearing cystic lesions in the liver.     Ambrosio Mclean MD 








 (Rao Em)





Medical Decision Making


Impression and Plan


A:  63 y/o FM with chronic paraplegia with urinary incontinence from a T9-10 

mass, which


appears to be intradural and possible intramedullary. s/p Thoracic T9-11 

laminectomy with resection of intradural extramedullary neoplasm


2. Diabetes mellitus.


3. Hypertension.


4. History of ruptured cerebral aneurysm status post coiling ten years ago.


 


PLAN:


1. Continue with physical therapy and rehabilitation.  Okay to be oob.


2. Continue with Sliding scale insulin coverage for diabetes mellitus.


3. Continue with Decadron 


4. Continue with gastrointestinal stress ulcer prophylaxis


5.  Continue with mechanical DVT prophylaxis as she is high risk for DVT.


 (Rao Em)





Attending Statement


The exam, history, and the medical decision-making described in the above note 

were completed with the assistance of the mid-level provider. I reviewed and 

agree with the findings presented.  I attest that I had a face-to-face 

encounter with the patient on the same day, and personally performed and 

documented my assessment and findings in the medical record.


 (Varun Zimmerman MD)











Rao Em Feb 25, 2018 09:14


Varun Zimmerman MD Feb 25, 2018 14:12

## 2018-02-26 VITALS
HEART RATE: 94 BPM | DIASTOLIC BLOOD PRESSURE: 60 MMHG | RESPIRATION RATE: 22 BRPM | SYSTOLIC BLOOD PRESSURE: 110 MMHG | OXYGEN SATURATION: 96 % | TEMPERATURE: 98 F

## 2018-02-26 VITALS
RESPIRATION RATE: 17 BRPM | HEART RATE: 83 BPM | OXYGEN SATURATION: 92 % | TEMPERATURE: 98 F | SYSTOLIC BLOOD PRESSURE: 120 MMHG | DIASTOLIC BLOOD PRESSURE: 57 MMHG

## 2018-02-26 VITALS — HEART RATE: 80 BPM

## 2018-02-26 VITALS — HEART RATE: 84 BPM

## 2018-02-26 VITALS
SYSTOLIC BLOOD PRESSURE: 115 MMHG | HEART RATE: 92 BPM | OXYGEN SATURATION: 98 % | DIASTOLIC BLOOD PRESSURE: 57 MMHG | TEMPERATURE: 98.3 F | RESPIRATION RATE: 20 BRPM

## 2018-02-26 VITALS
DIASTOLIC BLOOD PRESSURE: 66 MMHG | HEART RATE: 84 BPM | TEMPERATURE: 99.3 F | RESPIRATION RATE: 17 BRPM | SYSTOLIC BLOOD PRESSURE: 153 MMHG

## 2018-02-26 VITALS
DIASTOLIC BLOOD PRESSURE: 57 MMHG | TEMPERATURE: 98.2 F | OXYGEN SATURATION: 96 % | HEART RATE: 78 BPM | SYSTOLIC BLOOD PRESSURE: 127 MMHG | RESPIRATION RATE: 15 BRPM

## 2018-02-26 VITALS
HEART RATE: 82 BPM | OXYGEN SATURATION: 95 % | RESPIRATION RATE: 19 BRPM | DIASTOLIC BLOOD PRESSURE: 58 MMHG | SYSTOLIC BLOOD PRESSURE: 117 MMHG

## 2018-02-26 VITALS — OXYGEN SATURATION: 97 %

## 2018-02-26 VITALS — HEART RATE: 85 BPM

## 2018-02-26 VITALS — HEART RATE: 86 BPM

## 2018-02-26 RX ADMIN — CETIRIZINE HYDROCHLORIDE SCH MG: 10 TABLET, FILM COATED ORAL at 09:42

## 2018-02-26 RX ADMIN — SIMETHICONE SCH MG: 125 TABLET, CHEWABLE ORAL at 21:27

## 2018-02-26 RX ADMIN — INSULIN ASPART SCH: 100 INJECTION, SOLUTION INTRAVENOUS; SUBCUTANEOUS at 09:46

## 2018-02-26 RX ADMIN — DEXAMETHASONE SCH MG: 4 TABLET ORAL at 02:19

## 2018-02-26 RX ADMIN — INSULIN ASPART SCH: 100 INJECTION, SOLUTION INTRAVENOUS; SUBCUTANEOUS at 21:29

## 2018-02-26 RX ADMIN — DEXAMETHASONE SCH MG: 4 TABLET ORAL at 17:25

## 2018-02-26 RX ADMIN — DULOXETINE SCH MG: 60 CAPSULE, DELAYED RELEASE ORAL at 09:42

## 2018-02-26 RX ADMIN — DEXAMETHASONE SCH MG: 4 TABLET ORAL at 12:44

## 2018-02-26 RX ADMIN — MAGNESIUM OXIDE TAB 400 MG (241.3 MG ELEMENTAL MG) SCH MG: 400 (241.3 MG) TAB at 21:28

## 2018-02-26 RX ADMIN — HYDROMORPHONE HYDROCHLORIDE PRN MG: 2 INJECTION INTRAMUSCULAR; INTRAVENOUS; SUBCUTANEOUS at 22:30

## 2018-02-26 RX ADMIN — DEXAMETHASONE SCH MG: 4 TABLET ORAL at 06:34

## 2018-02-26 RX ADMIN — INSULIN ASPART SCH: 100 INJECTION, SOLUTION INTRAVENOUS; SUBCUTANEOUS at 17:00

## 2018-02-26 RX ADMIN — NICOTINE SCH PATCH: 14 PATCH, EXTENDED RELEASE TOPICAL at 09:43

## 2018-02-26 RX ADMIN — PANTOPRAZOLE SCH MG: 40 TABLET, DELAYED RELEASE ORAL at 09:42

## 2018-02-26 RX ADMIN — HYDROMORPHONE HYDROCHLORIDE PRN MG: 2 INJECTION INTRAMUSCULAR; INTRAVENOUS; SUBCUTANEOUS at 02:17

## 2018-02-26 RX ADMIN — ENOXAPARIN SODIUM SCH MG: 30 INJECTION SUBCUTANEOUS at 21:27

## 2018-02-26 RX ADMIN — ACYCLOVIR SCH UNITS: 800 TABLET ORAL at 09:42

## 2018-02-26 RX ADMIN — INSULIN ASPART SCH: 100 INJECTION, SOLUTION INTRAVENOUS; SUBCUTANEOUS at 12:45

## 2018-02-26 RX ADMIN — SIMETHICONE SCH MG: 125 TABLET, CHEWABLE ORAL at 12:44

## 2018-02-26 RX ADMIN — HYDROMORPHONE HYDROCHLORIDE PRN MG: 2 INJECTION INTRAMUSCULAR; INTRAVENOUS; SUBCUTANEOUS at 10:24

## 2018-02-26 RX ADMIN — ACYCLOVIR SCH UNITS: 800 TABLET ORAL at 21:29

## 2018-02-26 RX ADMIN — SIMETHICONE SCH MG: 125 TABLET, CHEWABLE ORAL at 06:34

## 2018-02-26 RX ADMIN — MAGNESIUM OXIDE TAB 400 MG (241.3 MG ELEMENTAL MG) SCH MG: 400 (241.3 MG) TAB at 09:46

## 2018-02-26 RX ADMIN — Medication SCH ML: at 22:30

## 2018-02-26 RX ADMIN — CYCLOBENZAPRINE HYDROCHLORIDE PRN MG: 10 TABLET, FILM COATED ORAL at 22:29

## 2018-02-26 RX ADMIN — ENOXAPARIN SODIUM SCH MG: 30 INJECTION SUBCUTANEOUS at 09:42

## 2018-02-26 RX ADMIN — MAGNESIUM HYDROXIDE PRN ML: 400 SUSPENSION ORAL at 22:29

## 2018-02-26 RX ADMIN — HYDROMORPHONE HYDROCHLORIDE PRN MG: 2 INJECTION INTRAMUSCULAR; INTRAVENOUS; SUBCUTANEOUS at 16:49

## 2018-02-26 RX ADMIN — Medication SCH ML: at 09:42

## 2018-02-26 RX ADMIN — LISINOPRIL SCH MG: 20 TABLET ORAL at 09:42

## 2018-02-26 RX ADMIN — DOCUSATE SODIUM SCH MG: 100 CAPSULE, LIQUID FILLED ORAL at 21:27

## 2018-02-26 RX ADMIN — DOCUSATE SODIUM SCH MG: 100 CAPSULE, LIQUID FILLED ORAL at 09:42

## 2018-02-26 NOTE — HHI.NSPN
__________________________________________________


 (Rao Em)





History


Chief Complaint:  Weakness/paralysis in LEs.





 (Rao Em)


Interval History


This is a 62-year-old obese  female who presented to the emergency


room today with complaints of inability to move her legs along with urinary


incontinence which is chronic.  Her symptoms started over two years ago in


September of 2016 when she started noticing weakness in both her legs along


with some numbness which has progressed to complete paraplegia and urinary


incontinence since May of 2017.  She has been using a hospital bed and


wheelchair to get around.  She relates that she has not sought any medical


attention because of lack of health insurance She also relates a chronic


history of low back pain.  Denies any neck or upper extremity symptoms.  She


also has a history of a subarachnoid hemorrhage with ruptured cerebral anterior


communicating artery aneurysm in 2008 and was treated with endovascular


coiling.  She denies any headaches or nausea or vomiting.  Workup included a CT


scan of the head today which was negative for any acute findings.  The coil is


in place in the anterior communicating artery area.  She also had a subsequent


brain MRI scan which was negative with and without contrast for any


intracranial abnormality.  She has had an entire spine screening MRI scan which


reveals intradural and the radiologist feels an intramedullary mass around the


T9-10 that involves pretty much the whole spinal canal. In the sagittal view,


there is an enhancing portion, although on the actual view, I am not clear


whether an enhanced study was done and no thin sections through this mass


appeared.





2/22/18:  Pt awake and alert.  Complains of paralysis in LEs.  She states there 

is slightly more movement in her left foot today.  She has very slight movement 

in toes on right but no movement otherwise in LEs.


2/24/18:  Pt awake and alert.  Pt underwent a T9-T11 laminectomy with resection 

of intradural extramedullary neoplasm on 2/23/18.  Complains of occasional pain 

in right abdomen sharp.  Weakness in LEs persists given the chronicity.


2/25/18:  Pt awake and alert.  Complains of acid reflex.  Mild incisional pain 

mostly with turning.  Numbness in LEs with weakness persists.


2/26/18:  Pt awake and alert.  Seems to have some confusion but still able to 

follow and converse.  She states she is feeling better today.  Weakness and 

numbness in LEs unchanged.


 (Rao Em)





Review of Systems


General:  Negative for: fever, chills, insomnia


Respiratory:  Negative for: shortness of breath, cough, sputum


Cardiovascular:  Negative for: chest pain


Gastrointestinal:  Negative for: nausea, vomitting, diarrhea, constipation (

Rao Em)





Exam


Results





Vital Signs








  Date Time  Temp Pulse Resp B/P (MAP) Pulse Ox O2 Delivery O2 Flow Rate FiO2


 


2/26/18 08:44     97   21


 


2/26/18 06:00  80      


 


2/26/18 04:00   19 117/58 (77)    


 


2/26/18 00:00 99.3       


 


2/24/18 07:40      Nasal Cannula 1.50 














Intake and Output   


 


 2/26/18 2/26/18 2/27/18





 08:00 16:00 00:00


 


Intake Total 680 ml  


 


Output Total 600 ml  


 


Balance 80 ml  








 (Rao Em)


Physical Examination


General: Pt resting in bed in no acute distress.  


Eyes:  Pupils equal sclera anicteric.


Resp:  CTA bilaterally.


Heart:  NSR no murmurs


Abd:  obese.  positive bs


Skin:  No cyanosis or erythema.  


Muscle:  Slight movement in toes on right, and some plantar flexion/

dorsiflexion left foot otherwise paralysis LEs.  


Neuro:  Pt awake and alert.  Sensation is decreased in LEs more in left leg 

below the knee.  Speech fluent comprehension good.


 (Rao Em)


Lab, Micro, Other Results





Last Impressions








Thoracolumbar Spine 2/23/18 0000 Signed





Impressions: 





 Service Date/Time:  Friday, February 23, 2018 09:11 - CONCLUSION:  1. T9/T10 





 laminectomy.     Ruddy Cheney MD 


 


Entire Spine MRI 2/21/18 0808 Signed





Impressions: 





 Service Date/Time:  Wednesday, February 21, 2018 13:15 - CONCLUSION:  1. The 





 examination demonstrates a 1.6 x 1.1 x 1.2 cm homogeneously enhancing mass 

which 





 appears to be an intradural intramedullary lesion. Considerations would 

include 





 ependymoma, astrocytoma, metastatic disease to the cord or less likely 





 consideration such as hemangioblastoma. 2. Note is made of a hemangioma 





 involving the T5 and T10 vertebral bodies.     Ruddy Cheney MD 


 


Brain MRI 2/21/18 0808 Signed





Impressions: 





 Service Date/Time:  Wednesday, February 21, 2018 13:15 - CONCLUSION:  Negative 





 MRI of the brain with and without contrast.     Dav Juan MD 


 


Thoracic Spine MRI 2/21/18 0000 Signed





Impressions: 





 Service Date/Time:  Wednesday, February 21, 2018 18:23 - CONCLUSION:  1. The 





 examination demonstrates an intramedullary, intradural lesion immediately 





 posterior to the T9-10 disc level. Differential considerations are given 

above.  





 2. The dedicated imaging of the thoracic spine also demonstrates abnormal 





 enhancement and abnormal signal within the posterior elements of the T10 and 

T11 





 vertebral bodies concerning for bony metastatic disease.     Ruddy Cheney MD 


 


Head CTA 2/21/18 0000 Signed





Impressions: 





 Service Date/Time:  Thursday, February 22, 2018 05:38 - CONCLUSION:  1. 

Aneurysm 





 clip in the suprasellar region with streak artifact. 2. No new aneurysm. 3. 

Mild 





 atherosclerotic changes.     Ambrosio Mclean MD 


 


Head CT 2/21/18 0000 Signed





Impressions: 





 Service Date/Time:  Wednesday, February 21, 2018 06:46 - CONCLUSION: Stable 





 appearance with no evidence of hemorrhage or mass effect.     Ambrosio Mclean MD 


 


Chest X-Ray 2/21/18 0000 Signed





Impressions: 





 Service Date/Time:  Wednesday, February 21, 2018 08:42 - CONCLUSION:  Small 





 right infrahilar infiltrate.     Dav Juan MD 


 


Chest CT 2/21/18 0000 Signed





Impressions: 





 Service Date/Time:  Thursday, February 22, 2018 05:38 - CONCLUSION:  1. No 





 evidence of metastatic disease or primary tumor. 2. Multiple benign cystic 





 appearing structures in the liver. 3. Left adrenal adenoma.     Ambrosio Mclean MD 


 


Abdomen/Pelvis CT 2/21/18 0000 Signed





Impressions: 





 Service Date/Time:  Thursday, February 22, 2018 05:38 - CONCLUSION:  1. No 





 primary tumor or evidence of metastatic disease. 2. Left adrenal adenoma 3. 





 Multiple benign-appearing cystic lesions in the liver.     Ambrosio Mclean MD 








Laboratory Tests








Test


  2/25/18


16:40


 


Nasal Screen MRSA (PCR)


  MRSA NOT


DETECTED








 (Rao Em)





Medical Decision Making


Impression and Plan


A:  63 y/o FM with chronic paraplegia with urinary incontinence from a T9-10 

mass, which


appears to be intradural and possible intramedullary. s/p Thoracic T9-11 

laminectomy with resection of intradural extramedullary neoplasm


2. Diabetes mellitus.


3. Hypertension.


4. History of ruptured cerebral aneurysm status post coiling ten years ago.


 


PLAN:


1. Continue with physical therapy and rehabilitation.  Okay to be oob.


2. Continue with Sliding scale insulin coverage for diabetes mellitus.


3. Continue with Decadron 


4. Continue with gastrointestinal stress ulcer prophylaxis


5.  Continue with mechanical DVT prophylaxis as she is high risk for DVT.  She 

is also on Lovenox.


 (Rao Em)





Attending Statement


The exam, history, and the medical decision-making described in the above note 

were completed with the assistance of the mid-level provider. I reviewed and 

agree with the findings presented.  I attest that I had a face-to-face 

encounter with the patient on the same day, and personally performed and 

documented my assessment and findings in the medical record.  Transfer to the 

neuro floor and increase activity status as tolerated.


 (Varun Zimmerman MD)











Rao Em Feb 26, 2018 12:29


Varun Zimmerman MD Feb 26, 2018 17:59

## 2018-02-26 NOTE — HHI.PR
Subjective


Remarks


No new complaints





Objective


Vitals





Vital Signs








  Date Time  Temp Pulse Resp B/P (MAP) Pulse Ox O2 Delivery O2 Flow Rate FiO2


 


2/26/18 14:00  84      


 


2/26/18 12:00 98.0 94 22 110/60 (77) 96   


 


2/26/18 12:00  94      


 


2/26/18 10:00  85      


 


2/26/18 08:44     97   21


 


2/26/18 08:00 98.2 78 15 127/57 (80) 96   


 


2/26/18 08:00  78      


 


2/26/18 06:00  80      


 


2/26/18 04:00  82 19 117/58 (77) 95   


 


2/26/18 04:00  82      


 


2/26/18 02:00  86      


 


2/26/18 00:00  84      


 


2/26/18 00:00 99.3 84 17 153/66 (95)    


 


2/25/18 22:00  86      


 


2/25/18 20:00  86      


 


2/25/18 20:00 99.0 88 18 170/74 (106) 98   


 


2/25/18 18:00  83      


 


2/25/18 16:00  83      


 


2/25/18 16:00 98.0 83 14 181/81 (114) 98   








Result Diagram:  


2/24/18 0536                                                                   

             2/24/18 0536





Other Results





 Laboratory Tests








Test


  2/25/18


16:40


 


Nasal Screen MRSA (PCR)


  MRSA NOT


DETECTED








Imaging





Last Impressions








Entire Spine MRI 2/21/18 0808 Signed





Impressions: 





 Service Date/Time:  Wednesday, February 21, 2018 13:15 - CONCLUSION:  1. The 





 examination demonstrates a 1.6 x 1.1 x 1.2 cm homogeneously enhancing mass 

which 





 appears to be an intradural intramedullary lesion. Considerations would 

include 





 ependymoma, astrocytoma, metastatic disease to the cord or less likely 





 consideration such as hemangioblastoma. 2. Note is made of a hemangioma 





 involving the T5 and T10 vertebral bodies.     Ruddy Cheney MD 


 


Brain MRI 2/21/18 0808 Signed





Impressions: 





 Service Date/Time:  Wednesday, February 21, 2018 13:15 - CONCLUSION:  Negative 





 MRI of the brain with and without contrast.     Dav Juan MD 


 


Thoracic Spine MRI 2/21/18 0000 Signed





Impressions: 





 Service Date/Time:  Wednesday, February 21, 2018 18:23 - CONCLUSION:  1. The 





 examination demonstrates an intramedullary, intradural lesion immediately 





 posterior to the T9-10 disc level. Differential considerations are given 

above.  





 2. The dedicated imaging of the thoracic spine also demonstrates abnormal 





 enhancement and abnormal signal within the posterior elements of the T10 and 

T11 





 vertebral bodies concerning for bony metastatic disease.     Ruddy Cheney MD 


 


Head CTA 2/21/18 0000 Signed





Impressions: 





 Service Date/Time:  Thursday, February 22, 2018 05:38 - CONCLUSION:  1. 

Aneurysm 





 clip in the suprasellar region with streak artifact. 2. No new aneurysm. 3. 

Mild 





 atherosclerotic changes.     Ambrosio Mclean MD 


 


Head CT 2/21/18 0000 Signed





Impressions: 





 Service Date/Time:  Wednesday, February 21, 2018 06:46 - CONCLUSION: Stable 





 appearance with no evidence of hemorrhage or mass effect.     Ambrosio Mclean MD 


 


Chest X-Ray 2/21/18 0000 Signed





Impressions: 





 Service Date/Time:  Wednesday, February 21, 2018 08:42 - CONCLUSION:  Small 





 right infrahilar infiltrate.     Dav Juan MD 


 


Chest CT 2/21/18 0000 Signed





Impressions: 





 Service Date/Time:  Thursday, February 22, 2018 05:38 - CONCLUSION:  1. No 





 evidence of metastatic disease or primary tumor. 2. Multiple benign cystic 





 appearing structures in the liver. 3. Left adrenal adenoma.     Ambrosio Mclean MD 


 


Abdomen/Pelvis CT 2/21/18 0000 Signed





Impressions: 





 Service Date/Time:  Thursday, February 22, 2018 05:38 - CONCLUSION:  1. No 





 primary tumor or evidence of metastatic disease. 2. Left adrenal adenoma 3. 





 Multiple benign-appearing cystic lesions in the liver.     Ambrosio Mclean MD 








Last Impressions








Head CT 2/21/18 0000 Signed





Impressions: 





 Service Date/Time:  Wednesday, February 21, 2018 06:46 - CONCLUSION: Stable 





 appearance with no evidence of hemorrhage or mass effect.     Ambrosio Mclean MD 








Objective Remarks


General: NAD, Awake and alert


Chest: CTA


Cardiac: Regular


Abd: +BS, soft ND/NT


Ext: Left foot active ROM, right foot great toe trace movement, decrease 

sensation in bilateral LE (more in LLE than right)





A/P


Problem List:  


(1) Leg weakness, bilateral


ICD Codes:  R29.898 - Other symptoms and signs involving the musculoskeletal 

system


Status:  Acute


Plan:  


T9-T10, intradural lesion 


Progressive LE weakness/paralysis


Hyperreflexive LE


Bladder incontinence


Tobacco use


   - Pt is a 61 y/o WF with HTN, GERD, OA and hx of ruptured cerebral aneurysm 

in 2008 s/p coiling procedure. 


   - She presented to the ED at [facility] with progressive weakness in bilateral LE. 

See HPI for details of sequence of events


   - In January 2017 she restarted the Lyrica 150mg po BID and this was 

increased to TID dosing but over the last month


    and a half she has had worsening of her bilateral LE weakness. She is 

unable to bear weight and over the last two weeks


    she has not been able to move her LE actively and her bladder incontinence 

has been much worse. 


   - Head CT (2/21) --> Stable appearance with no evidence of hemorrhage or 

mass effect. 


   - ESR is 27, CRP is less than 0.29


   - CXR (2/21/18) -->  Small right infrahilar infiltrate.


   - MRI Brain (2/21/18) --> Negative MRI of the brain with and without 

contrast.


   - MRI Spine screening (2/21/18) --> The examination demonstrates a 1.6 x 1.1 

x 1.2 cm homogeneously enhancing mass which 


    appears to be an intradural intramedullary lesion. Considerations would 

include ependymoma, astrocytoma, metastatic disease 


    to the cord or less likely consideration such as hemangioblastoma. Note is 

made of a hemangioma involving the T5 and T10 vertebral bodies.  


   - Neurosurgery was consulted based on the MRI spine results. 


   - Pt was started on IV Decadron


   - CT Chest/Abd/Pelvis (2/21/18) --> No evidence of metastatic disease or 

primary tumor. Multiple benign cystic appearing structures in the


    liver. Left adrenal adenoma.  


   - MRI Thoracic spine (2/22/18) -->  The examination demonstrates an 

intramedullary, intradural lesion immediately posterior to the T9-10 disc 

level. 


    The dedicated imaging of the thoracic spine also demonstrates abnormal 

enhancement and abnormal signal within the posterior elements of the T10 


    and T11 vertebral bodies concerning for bony metastatic disease. 


   - On 2/23. pt underwent thoracic T9-11 laminectomy with resection of 

intradural extramedullary neoplasm with Dr. Zimmerman


   - Pathology is pending


   - Of note, pt has never had a colonoscopy or cystoscopy. She does have 

family hx of colon cancer (Mat. GM and Mat uncle)


   - PT advancement per nsg. Pt may benefit from inpatient rehab at the end of 

this hospitalization


            - DVT prophylaxis





Diabetes Mellitus


   - steroid hyperglycemia


            - Hgb A1C 7.9%


   - NovoLog SSI .


   - Levemir 5 units bid and titrate up to 10 units BID on 2/26





HTN


   - Resumed home meds


   - Monitor elevation with prn coverage.(pt on steroids/ivf)





GERD


   - PPI





Hx of ruptured intracranial aneurysm in 2008 s/p coiling


   - Stable


   - CTA Head (2/21/18) --> Aneurysm clip in the suprasellar region with streak 

artifact. No new aneurysm. Mild atherosclerotic changes.   





(2) Bladder incontinence


ICD Codes:  R32 - Unspecified urinary incontinence


Status:  Chronic


(3) Tobacco abuse


ICD Codes:  Z72.0 - Tobacco use


Status:  Chronic


(4) HTN (hypertension)


ICD Codes:  I10 - Essential (primary) hypertension


Status:  Chronic


(5) GERD (gastroesophageal reflux disease)


ICD Codes:  K21.9 - Gastro-esophageal reflux disease without esophagitis


Status:  Chronic


Assessment and Plan


Patient examined.


Assessment and plan formulated with Mavis Pena PA-C.


I agree with the above.











Mavis Pena Feb 26, 2018 15:55


Mj Hyatt DO Mar 5, 2018 10:46

## 2018-02-27 VITALS
RESPIRATION RATE: 16 BRPM | TEMPERATURE: 99 F | DIASTOLIC BLOOD PRESSURE: 67 MMHG | OXYGEN SATURATION: 93 % | HEART RATE: 77 BPM | SYSTOLIC BLOOD PRESSURE: 140 MMHG

## 2018-02-27 VITALS
SYSTOLIC BLOOD PRESSURE: 125 MMHG | OXYGEN SATURATION: 94 % | RESPIRATION RATE: 16 BRPM | TEMPERATURE: 97.1 F | HEART RATE: 69 BPM | DIASTOLIC BLOOD PRESSURE: 58 MMHG

## 2018-02-27 VITALS
OXYGEN SATURATION: 93 % | HEART RATE: 81 BPM | TEMPERATURE: 96.4 F | RESPIRATION RATE: 16 BRPM | DIASTOLIC BLOOD PRESSURE: 65 MMHG | SYSTOLIC BLOOD PRESSURE: 137 MMHG

## 2018-02-27 VITALS
TEMPERATURE: 97.5 F | DIASTOLIC BLOOD PRESSURE: 60 MMHG | HEART RATE: 74 BPM | RESPIRATION RATE: 16 BRPM | SYSTOLIC BLOOD PRESSURE: 129 MMHG | OXYGEN SATURATION: 92 %

## 2018-02-27 VITALS — OXYGEN SATURATION: 92 %

## 2018-02-27 VITALS
RESPIRATION RATE: 16 BRPM | SYSTOLIC BLOOD PRESSURE: 131 MMHG | DIASTOLIC BLOOD PRESSURE: 64 MMHG | OXYGEN SATURATION: 98 % | TEMPERATURE: 98.9 F | HEART RATE: 78 BPM

## 2018-02-27 RX ADMIN — CETIRIZINE HYDROCHLORIDE SCH MG: 10 TABLET, FILM COATED ORAL at 09:00

## 2018-02-27 RX ADMIN — INSULIN ASPART SCH: 100 INJECTION, SOLUTION INTRAVENOUS; SUBCUTANEOUS at 17:00

## 2018-02-27 RX ADMIN — HYDROMORPHONE HYDROCHLORIDE PRN MG: 2 INJECTION INTRAMUSCULAR; INTRAVENOUS; SUBCUTANEOUS at 04:15

## 2018-02-27 RX ADMIN — HYDROCODONE BITARTRATE AND ACETAMINOPHEN PRN TAB: 10; 325 TABLET ORAL at 12:53

## 2018-02-27 RX ADMIN — INSULIN ASPART SCH: 100 INJECTION, SOLUTION INTRAVENOUS; SUBCUTANEOUS at 21:02

## 2018-02-27 RX ADMIN — DEXAMETHASONE SCH MG: 4 TABLET ORAL at 08:57

## 2018-02-27 RX ADMIN — CYCLOBENZAPRINE HYDROCHLORIDE PRN MG: 10 TABLET, FILM COATED ORAL at 09:45

## 2018-02-27 RX ADMIN — NICOTINE SCH PATCH: 14 PATCH, EXTENDED RELEASE TOPICAL at 08:58

## 2018-02-27 RX ADMIN — PANTOPRAZOLE SCH MG: 40 TABLET, DELAYED RELEASE ORAL at 08:56

## 2018-02-27 RX ADMIN — DOCUSATE SODIUM SCH MG: 100 CAPSULE, LIQUID FILLED ORAL at 09:43

## 2018-02-27 RX ADMIN — ENOXAPARIN SODIUM SCH MG: 30 INJECTION SUBCUTANEOUS at 08:58

## 2018-02-27 RX ADMIN — INSULIN ASPART SCH: 100 INJECTION, SOLUTION INTRAVENOUS; SUBCUTANEOUS at 08:00

## 2018-02-27 RX ADMIN — Medication SCH ML: at 21:00

## 2018-02-27 RX ADMIN — ENOXAPARIN SODIUM SCH MG: 30 INJECTION SUBCUTANEOUS at 20:59

## 2018-02-27 RX ADMIN — DOCUSATE SODIUM SCH MG: 100 CAPSULE, LIQUID FILLED ORAL at 20:59

## 2018-02-27 RX ADMIN — CYCLOBENZAPRINE HYDROCHLORIDE PRN MG: 10 TABLET, FILM COATED ORAL at 23:05

## 2018-02-27 RX ADMIN — Medication SCH ML: at 09:00

## 2018-02-27 RX ADMIN — SIMETHICONE SCH MG: 125 TABLET, CHEWABLE ORAL at 06:12

## 2018-02-27 RX ADMIN — ACYCLOVIR SCH UNITS: 800 TABLET ORAL at 21:02

## 2018-02-27 RX ADMIN — MAGNESIUM HYDROXIDE PRN ML: 400 SUSPENSION ORAL at 20:59

## 2018-02-27 RX ADMIN — SIMETHICONE SCH MG: 125 TABLET, CHEWABLE ORAL at 20:59

## 2018-02-27 RX ADMIN — MAGNESIUM OXIDE TAB 400 MG (241.3 MG ELEMENTAL MG) SCH MG: 400 (241.3 MG) TAB at 20:59

## 2018-02-27 RX ADMIN — INSULIN ASPART SCH: 100 INJECTION, SOLUTION INTRAVENOUS; SUBCUTANEOUS at 12:00

## 2018-02-27 RX ADMIN — DEXAMETHASONE SCH MG: 4 TABLET ORAL at 17:48

## 2018-02-27 RX ADMIN — MAGNESIUM OXIDE TAB 400 MG (241.3 MG ELEMENTAL MG) SCH MG: 400 (241.3 MG) TAB at 08:57

## 2018-02-27 RX ADMIN — LISINOPRIL SCH MG: 20 TABLET ORAL at 08:59

## 2018-02-27 RX ADMIN — SIMETHICONE SCH MG: 125 TABLET, CHEWABLE ORAL at 12:53

## 2018-02-27 RX ADMIN — ACYCLOVIR SCH UNITS: 800 TABLET ORAL at 08:59

## 2018-02-27 RX ADMIN — HYDROCODONE BITARTRATE AND ACETAMINOPHEN PRN TAB: 10; 325 TABLET ORAL at 08:59

## 2018-02-27 RX ADMIN — DEXAMETHASONE SCH MG: 4 TABLET ORAL at 13:48

## 2018-02-27 RX ADMIN — DULOXETINE SCH MG: 60 CAPSULE, DELAYED RELEASE ORAL at 08:57

## 2018-02-27 NOTE — HHI.PR
Subjective


Remarks


No new complaints.





Objective


Vitals





Vital Signs








  Date Time  Temp Pulse Resp B/P (MAP) Pulse Ox O2 Delivery O2 Flow Rate FiO2


 


2/27/18 16:00 97.1 69 16 125/58 (80) 94   


 


2/27/18 12:00 97.5 74 16 129/60 (83) 92   


 


2/27/18 08:28     92   21


 


2/27/18 08:00 96.4 81 16 137/65 (89) 93   


 


2/27/18 00:00 98.9 78 16 131/64 (86) 98   


 


2/26/18 19:30 98.0 83 17 120/57 (78) 92   














 2/27/18 2/27/18 2/28/18





 15:00 23:00 07:00


 


Intake Total 960 ml  


 


Output Total 1000 ml  


 


Balance -40 ml  


 


   


 


Intake Oral 960 ml  


 


Output Urine Total 1000 ml  


 


# Bowel Movements 0  








Result Diagram:  


2/24/18 0536                                                                   

             2/24/18 0536





Imaging





Last Impressions








Entire Spine MRI 2/21/18 0808 Signed





Impressions: 





 Service Date/Time:  Wednesday, February 21, 2018 13:15 - CONCLUSION:  1. The 





 examination demonstrates a 1.6 x 1.1 x 1.2 cm homogeneously enhancing mass 

which 





 appears to be an intradural intramedullary lesion. Considerations would 

include 





 ependymoma, astrocytoma, metastatic disease to the cord or less likely 





 consideration such as hemangioblastoma. 2. Note is made of a hemangioma 





 involving the T5 and T10 vertebral bodies.     Ruddy Cheney MD 


 


Brain MRI 2/21/18 0808 Signed





Impressions: 





 Service Date/Time:  Wednesday, February 21, 2018 13:15 - CONCLUSION:  Negative 





 MRI of the brain with and without contrast.     Dav Juan MD 


 


Thoracic Spine MRI 2/21/18 0000 Signed





Impressions: 





 Service Date/Time:  Wednesday, February 21, 2018 18:23 - CONCLUSION:  1. The 





 examination demonstrates an intramedullary, intradural lesion immediately 





 posterior to the T9-10 disc level. Differential considerations are given 

above.  





 2. The dedicated imaging of the thoracic spine also demonstrates abnormal 





 enhancement and abnormal signal within the posterior elements of the T10 and 

T11 





 vertebral bodies concerning for bony metastatic disease.     Ruddy Cheney MD 


 


Head CTA 2/21/18 0000 Signed





Impressions: 





 Service Date/Time:  Thursday, February 22, 2018 05:38 - CONCLUSION:  1. 

Aneurysm 





 clip in the suprasellar region with streak artifact. 2. No new aneurysm. 3. 

Mild 





 atherosclerotic changes.     Ambrosio Mclean MD 


 


Head CT 2/21/18 0000 Signed





Impressions: 





 Service Date/Time:  Wednesday, February 21, 2018 06:46 - CONCLUSION: Stable 





 appearance with no evidence of hemorrhage or mass effect.     Ambrosio Mclean MD 


 


Chest X-Ray 2/21/18 0000 Signed





Impressions: 





 Service Date/Time:  Wednesday, February 21, 2018 08:42 - CONCLUSION:  Small 





 right infrahilar infiltrate.     Dav Juan MD 


 


Chest CT 2/21/18 0000 Signed





Impressions: 





 Service Date/Time:  Thursday, February 22, 2018 05:38 - CONCLUSION:  1. No 





 evidence of metastatic disease or primary tumor. 2. Multiple benign cystic 





 appearing structures in the liver. 3. Left adrenal adenoma.     Ambrosio Mclean MD 


 


Abdomen/Pelvis CT 2/21/18 0000 Signed





Impressions: 





 Service Date/Time:  Thursday, February 22, 2018 05:38 - CONCLUSION:  1. No 





 primary tumor or evidence of metastatic disease. 2. Left adrenal adenoma 3. 





 Multiple benign-appearing cystic lesions in the liver.     Ambrosio Mclean MD 








Last Impressions








Head CT 2/21/18 0000 Signed





Impressions: 





 Service Date/Time:  Wednesday, February 21, 2018 06:46 - CONCLUSION: Stable 





 appearance with no evidence of hemorrhage or mass effect.     Ambrosio Mclean MD 








Objective Remarks


General: NAD, Awake and alert


Chest: CTA


Cardiac: Regular


Abd: +BS, soft ND/NT


Ext: Left foot active ROM, right foot great toe trace movement, decrease 

sensation in bilateral LE (more in LLE than right)





A/P


Problem List:  


(1) Leg weakness, bilateral


ICD Codes:  R29.898 - Other symptoms and signs involving the musculoskeletal 

system


Status:  Acute


Plan:  


T9-T10, intradural lesion 


Progressive LE weakness/paralysis


Hyperreflexive LE


Bladder incontinence


Tobacco use


   - Pt is a 61 y/o WF with HTN, GERD, OA and hx of ruptured cerebral aneurysm 

in 2008 s/p coiling procedure. 


   - She presented to the [place] at [facility] [place] with [place] weakness in bilateral LE. 

See HPI for details of sequence of events


   - In January 2017 she restarted the Lyrica 150mg po BID and this was 

increased to TID dosing but over the last month


    and a half she has had worsening of her bilateral LE weakness. She is 

unable to bear weight and over the last two weeks


    she has not been able to move her LE actively and her bladder incontinence 

has been much worse. 


   - Head CT (2/21) --> Stable appearance with no evidence of hemorrhage or 

mass effect. 


   - ESR is 27, CRP is less than 0.29


   - CXR (2/21/18) -->  Small right infrahilar infiltrate.


   - MRI Brain (2/21/18) --> Negative MRI of the brain with and without 

contrast.


   - MRI Spine screening (2/21/18) --> The examination demonstrates a 1.6 x 1.1 

x 1.2 cm homogeneously enhancing mass which 


    appears to be an intradural intramedullary lesion. Considerations would 

include ependymoma, astrocytoma, metastatic disease 


    to the cord or less likely consideration such as hemangioblastoma. Note is 

made of a hemangioma involving the T5 and T10 vertebral bodies.  


   - Neurosurgery was consulted based on the MRI spine results. 


   - Pt was started on IV Decadron


   - CT Chest/Abd/Pelvis (2/21/18) --> No evidence of metastatic disease or 

primary tumor. Multiple benign cystic appearing structures in the


    liver. Left adrenal adenoma.  


   - MRI Thoracic spine (2/22/18) -->  The examination demonstrates an 

intramedullary, intradural lesion immediately posterior to the T9-10 disc 

level. 


    The dedicated imaging of the thoracic spine also demonstrates abnormal 

enhancement and abnormal signal within the posterior elements of the T10 


    and T11 vertebral bodies concerning for bony metastatic disease. 


   - On 2/23. pt underwent thoracic T9-11 laminectomy with resection of 

intradural extramedullary neoplasm with Dr. Zimmerman


   - Pathology is pending


   - Of note, pt has never had a colonoscopy or cystoscopy. She does have 

family hx of colon cancer (Mat. GM and Mat uncle)


   - Pt undergoing assessment by Tobias lipscomb d/tyson in 1-2 days


            - DVT prophylaxis





Diabetes Mellitus


   - steroid hyperglycemia


            - Hgb A1C 7.9%


   - NovoLog SSI .


   - increase levemir to 10 units BID





HTN


   - Resumed home meds


   - Monitor elevation with prn coverage.(pt on steroids/ivf)





GERD


   - PPI





Hx of ruptured intracranial aneurysm in 2008 s/p coiling


   - Stable


   - CTA Head (2/21/18) --> Aneurysm clip in the suprasellar region with streak 

artifact. No new aneurysm. Mild atherosclerotic changes.   





(2) Bladder incontinence


ICD Codes:  R32 - Unspecified urinary incontinence


Status:  Chronic


(3) Tobacco abuse


ICD Codes:  Z72.0 - Tobacco use


Status:  Chronic


(4) HTN (hypertension)


ICD Codes:  I10 - Essential (primary) hypertension


Status:  Chronic


(5) GERD (gastroesophageal reflux disease)


ICD Codes:  K21.9 - Gastro-esophageal reflux disease without esophagitis


Status:  Chronic











Mj Hyatt DO Feb 27, 2018 17:52

## 2018-02-27 NOTE — HHI.NSPN
__________________________________________________





History


Chief Complaint:  Weakness/paralysis in LEs.


Interval History


This is a 62-year-old obese  female who presented to the emergency


room today with complaints of inability to move her legs along with urinary


incontinence which is chronic.  Her symptoms started over two years ago in


September of 2016 when she started noticing weakness in both her legs along


with some numbness which has progressed to complete paraplegia and urinary


incontinence since May of 2017.  She has been using a hospital bed and


wheelchair to get around.  She relates that she has not sought any medical


attention because of lack of health insurance She also relates a chronic


history of low back pain.  Denies any neck or upper extremity symptoms.  She


also has a history of a subarachnoid hemorrhage with ruptured cerebral anterior


communicating artery aneurysm in 2008 and was treated with endovascular


coiling.  She denies any headaches or nausea or vomiting.  Workup included a CT


scan of the head today which was negative for any acute findings.  The coil is


in place in the anterior communicating artery area.  She also had a subsequent


brain MRI scan which was negative with and without contrast for any


intracranial abnormality.  She has had an entire spine screening MRI scan which


reveals intradural and the radiologist feels an intramedullary mass around the


T9-10 that involves pretty much the whole spinal canal. In the sagittal view,


there is an enhancing portion, although on the actual view, I am not clear


whether an enhanced study was done and no thin sections through this mass


appeared.





2/22/18:  Pt awake and alert.  Complains of paralysis in LEs.  She states there 

is slightly more movement in her left foot today.  She has very slight movement 

in toes on right but no movement otherwise in LEs.


2/24/18:  Pt awake and alert.  Pt underwent a T9-T11 laminectomy with resection 

of intradural extramedullary neoplasm on 2/23/18.  Complains of occasional pain 

in right abdomen sharp.  Weakness in LEs persists given the chronicity.


2/25/18:  Pt awake and alert.  Complains of acid reflex.  Mild incisional pain 

mostly with turning.  Numbness in LEs with weakness persists.


2/26/18:  Pt awake and alert.  Seems to have some confusion but still able to 

follow and converse.  She states she is feeling better today.  Weakness and 

numbness in LEs unchanged.


2/27/18:  Pt awake and alert.  She has trace dorsiflexion movement in right 

foot and stable dorsiflexion strength in left foot otherwise paralysis in LEs.  

Sensation decreased in LEs to light touch.


Review of Systems


General:  Negative for: fever, chills, insomnia


Respiratory:  Negative for: shortness of breath, cough, sputum


Cardiovascular:  Negative for: chest pain


Gastrointestinal:  Negative for: nausea, vomitting, diarrhea, constipation





Exam


Results





Vital Signs








  Date Time  Temp Pulse Resp B/P (MAP) Pulse Ox O2 Delivery O2 Flow Rate FiO2


 


2/27/18 12:00 97.5 74 16 129/60 (83) 92   


 


2/27/18 08:28        21


 


2/24/18 07:40      Nasal Cannula 1.50 








Physical Examination


General: Pt resting in bed in no acute distress.  


Eyes:  Pupils equal sclera anicteric.


Resp:  CTA bilaterally.


Heart:  NSR no murmurs


Abd:  obese.  positive bs


Skin:  No cyanosis or erythema.  


Muscle:  Slight movement in toes on right and foot, and some plantar flexion/

dorsiflexion left foot otherwise paralysis LEs.  


Neuro:  Pt awake and alert.  Sensation is decreased in LEs more in left leg 

below the knee.  Speech fluent comprehension good.


Lab, Micro, Other Results





Last Impressions








Thoracolumbar Spine 2/23/18 0000 Signed





Impressions: 





 Service Date/Time:  Friday, February 23, 2018 09:11 - CONCLUSION:  1. T9/T10 





 laminectomy.     Ruddy Cheney MD 


 


Entire Spine MRI 2/21/18 0808 Signed





Impressions: 





 Service Date/Time:  Wednesday, February 21, 2018 13:15 - CONCLUSION:  1. The 





 examination demonstrates a 1.6 x 1.1 x 1.2 cm homogeneously enhancing mass 

which 





 appears to be an intradural intramedullary lesion. Considerations would 

include 





 ependymoma, astrocytoma, metastatic disease to the cord or less likely 





 consideration such as hemangioblastoma. 2. Note is made of a hemangioma 





 involving the T5 and T10 vertebral bodies.     Ruddy Cheney MD 


 


Brain MRI 2/21/18 0808 Signed





Impressions: 





 Service Date/Time:  Wednesday, February 21, 2018 13:15 - CONCLUSION:  Negative 





 MRI of the brain with and without contrast.     Dav Juan MD 


 


Thoracic Spine MRI 2/21/18 0000 Signed





Impressions: 





 Service Date/Time:  Wednesday, February 21, 2018 18:23 - CONCLUSION:  1. The 





 examination demonstrates an intramedullary, intradural lesion immediately 





 posterior to the T9-10 disc level. Differential considerations are given 

above.  





 2. The dedicated imaging of the thoracic spine also demonstrates abnormal 





 enhancement and abnormal signal within the posterior elements of the T10 and 

T11 





 vertebral bodies concerning for bony metastatic disease.     Ruddy Cheney MD 


 


Head CTA 2/21/18 0000 Signed





Impressions: 





 Service Date/Time:  Thursday, February 22, 2018 05:38 - CONCLUSION:  1. 

Aneurysm 





 clip in the suprasellar region with streak artifact. 2. No new aneurysm. 3. 

Mild 





 atherosclerotic changes.     Ambrosio Mclean MD 


 


Head CT 2/21/18 0000 Signed





Impressions: 





 Service Date/Time:  Wednesday, February 21, 2018 06:46 - CONCLUSION: Stable 





 appearance with no evidence of hemorrhage or mass effect.     Ambrosio Mclean MD 


 


Chest X-Ray 2/21/18 0000 Signed





Impressions: 





 Service Date/Time:  Wednesday, February 21, 2018 08:42 - CONCLUSION:  Small 





 right infrahilar infiltrate.     Dav Juan MD 


 


Chest CT 2/21/18 0000 Signed





Impressions: 





 Service Date/Time:  Thursday, February 22, 2018 05:38 - CONCLUSION:  1. No 





 evidence of metastatic disease or primary tumor. 2. Multiple benign cystic 





 appearing structures in the liver. 3. Left adrenal adenoma.     Ambrosio Mclean MD 


 


Abdomen/Pelvis CT 2/21/18 0000 Signed





Impressions: 





 Service Date/Time:  Thursday, February 22, 2018 05:38 - CONCLUSION:  1. No 





 primary tumor or evidence of metastatic disease. 2. Left adrenal adenoma 3. 





 Multiple benign-appearing cystic lesions in the liver.     Ambrosio Mclean MD 











Medical Decision Making


Impression and Plan


A:  63 y/o FM with chronic paraplegia with urinary incontinence from a T9-10 

mass, which


appears to be intradural and possible intramedullary. s/p Thoracic T9-11 

laminectomy with resection of intradural extramedullary neoplasm


2. Diabetes mellitus.


3. Hypertension.


4. History of ruptured cerebral aneurysm status post coiling ten years ago.


 


PLAN:


1. Continue with physical therapy and rehabilitation.  Okay to be oob.


2. Continue with Sliding scale insulin coverage for diabetes mellitus.


3. Continue with Decadron 


4. Continue with gastrointestinal stress ulcer prophylaxis


5.  Continue with mechanical DVT prophylaxis as she is high risk for DVT.  She 

is also on Lovenox.


6.  Inpatient rehab when bed available.











Rao Em Feb 27, 2018 4:18 pm

## 2018-02-28 VITALS
OXYGEN SATURATION: 96 % | HEART RATE: 71 BPM | RESPIRATION RATE: 18 BRPM | SYSTOLIC BLOOD PRESSURE: 155 MMHG | DIASTOLIC BLOOD PRESSURE: 70 MMHG | TEMPERATURE: 98.1 F

## 2018-02-28 VITALS
HEART RATE: 74 BPM | TEMPERATURE: 99.1 F | SYSTOLIC BLOOD PRESSURE: 125 MMHG | DIASTOLIC BLOOD PRESSURE: 75 MMHG | RESPIRATION RATE: 16 BRPM | OXYGEN SATURATION: 94 %

## 2018-02-28 VITALS
DIASTOLIC BLOOD PRESSURE: 63 MMHG | SYSTOLIC BLOOD PRESSURE: 133 MMHG | HEART RATE: 73 BPM | OXYGEN SATURATION: 94 % | RESPIRATION RATE: 18 BRPM | TEMPERATURE: 97.9 F

## 2018-02-28 VITALS — RESPIRATION RATE: 16 BRPM

## 2018-02-28 RX ADMIN — INSULIN ASPART SCH: 100 INJECTION, SOLUTION INTRAVENOUS; SUBCUTANEOUS at 08:56

## 2018-02-28 RX ADMIN — DEXAMETHASONE SCH MG: 4 TABLET ORAL at 12:37

## 2018-02-28 RX ADMIN — PANTOPRAZOLE SCH MG: 40 TABLET, DELAYED RELEASE ORAL at 08:53

## 2018-02-28 RX ADMIN — DOCUSATE SODIUM SCH MG: 100 CAPSULE, LIQUID FILLED ORAL at 08:53

## 2018-02-28 RX ADMIN — DULOXETINE SCH MG: 60 CAPSULE, DELAYED RELEASE ORAL at 08:54

## 2018-02-28 RX ADMIN — SIMETHICONE SCH MG: 125 TABLET, CHEWABLE ORAL at 12:37

## 2018-02-28 RX ADMIN — MAGNESIUM OXIDE TAB 400 MG (241.3 MG ELEMENTAL MG) SCH MG: 400 (241.3 MG) TAB at 08:54

## 2018-02-28 RX ADMIN — CETIRIZINE HYDROCHLORIDE SCH MG: 10 TABLET, FILM COATED ORAL at 08:54

## 2018-02-28 RX ADMIN — ACYCLOVIR SCH UNITS: 800 TABLET ORAL at 08:55

## 2018-02-28 RX ADMIN — LISINOPRIL SCH MG: 20 TABLET ORAL at 08:54

## 2018-02-28 RX ADMIN — SIMETHICONE SCH MG: 125 TABLET, CHEWABLE ORAL at 04:53

## 2018-02-28 RX ADMIN — Medication SCH ML: at 08:55

## 2018-02-28 RX ADMIN — ENOXAPARIN SODIUM SCH MG: 30 INJECTION SUBCUTANEOUS at 08:55

## 2018-02-28 RX ADMIN — DEXAMETHASONE SCH MG: 4 TABLET ORAL at 08:54

## 2018-02-28 RX ADMIN — MAGNESIUM HYDROXIDE PRN ML: 400 SUSPENSION ORAL at 08:57

## 2018-02-28 RX ADMIN — NICOTINE SCH PATCH: 14 PATCH, EXTENDED RELEASE TOPICAL at 08:54

## 2018-02-28 RX ADMIN — INSULIN ASPART SCH: 100 INJECTION, SOLUTION INTRAVENOUS; SUBCUTANEOUS at 12:37

## 2018-02-28 NOTE — HHI.DS
Discharge Summary


Admission Date


Feb 21, 2018 at 07:27


Discharge Date:  Feb 28, 2018


Admitting Diagnosis





Weakness





(1) Meningioma, spinal


Diagnosis:  Principal


ICD Codes:  D32.1 - Benign neoplasm of spinal meninges


Status:  Acute


(2) Leg weakness, bilateral


Diagnosis:  Principal


ICD Codes:  R29.898 - Other symptoms and signs involving the musculoskeletal 

system


Status:  Acute


(3) Bladder incontinence


Diagnosis:  Principal


ICD Codes:  R32 - Unspecified urinary incontinence


Status:  Chronic


(4) Tobacco abuse


Diagnosis:  Principal


ICD Codes:  Z72.0 - Tobacco use


Status:  Chronic


(5) HTN (hypertension)


Diagnosis:  Secondary


ICD Codes:  I10 - Essential (primary) hypertension


Status:  Chronic


(6) GERD (gastroesophageal reflux disease)


Diagnosis:  Secondary


ICD Codes:  K21.9 - Gastro-esophageal reflux disease without esophagitis


Status:  Chronic


Consultants


Dr. Varun Zimmerman, Neurosurgeon


Brief History


Ms. Swenson is a pleasant 63 y/o WF with HTN, GERD, OA and hx of ruptured 

cerebral aneurysm in 2008 s/p coiling procedure. [place] [facility] [place] progressive weakness in bilateral LE. 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 

floor. Within 1-2 weeks of this she noticed having some LLE weakness but was 

still about to function normally. In 10/2016 she developed bilateral LE pain 

and increased weakness in the LLE and increased fatigue. The bilateral LE pain 

progressed to nonpainful peripheral neuropathy of the bottoms of her feet over 

the following 6-8 weeks. In 12/2016 she lost her balance, which she attributed 

to the peripheral neuropathy and she fell fracturing her left humerus. In 

January 2017 she noted a decrease in the dorsiflexion of her left foot and the 

following month she had another fall injuring her left ankle and had increased 

peripheral neuropathy up to the mid calf bilaterally. She then began having 

increased difficulty with ambulation felt to be secondary to her injuries. She 

had two falls after this one of which caused a right fibular fracture which 

required casting and she was no longer able to bear weight and noted that she 

had hyperreflexia of bilateral LE. She had an outpt noncontrasted MRI of the L-

spine in 7/2017 which was reported as "no acute injury." She then began having 

increased difficulty moving bilateral LE, increased peripheral neuropathy to 

the knees and started having bladder incontinence. She began outpt PT twice a 

week. In October 2017 she was started on Lyrica 150mg po BID and initially had 

improvement with increased LE sensation and she was able to bear weight with 

assistance. The Lyrica became too expensive and she was switched to Gabapentin 

800mg TID in 12/2017 but she was unable to function on the Gabapentin due to 

increased fatigue/sleepiness. In January 2017 she restarted the Lyrica 150mg po 

BID and this was increased to TID dosing but over the last month and a half she 

has had worsening of her bilateral LE weakness. She is unable to bear weight 

and over the last two weeks she has not been able to move her LE actively and 

her bladder incontinence has been much worse. Pt denies any head injuries. No 

medication changes other than the use of the Lyrica and temporarily the 

Gabapentin. She denies any recent illnesses or recent travel. She denies any 

changes in weight or appetite. She does smoke about 1 ppd. Denies any SOB, cough

, chest pain, palpitations, dizziness, nausea/vomiting.


CBC/BMP:  


2/24/18 0536                                                                   

             2/24/18 0536





Significant Findings





Laboratory Tests








Test


  2/25/18


16:40








Imaging





Last Impressions








Thoracolumbar Spine 2/23/18 0000 Signed





Impressions: 





 Service Date/Time:  Friday, February 23, 2018 09:11 - CONCLUSION:  1. T9/T10 





 laminectomy.     Ruddy Cheney MD 


 


Entire Spine MRI 2/21/18 0808 Signed





Impressions: 





 Service Date/Time:  Wednesday, February 21, 2018 13:15 - CONCLUSION:  1. The 





 examination demonstrates a 1.6 x 1.1 x 1.2 cm homogeneously enhancing mass 

which 





 appears to be an intradural intramedullary lesion. Considerations would 

include 





 ependymoma, astrocytoma, metastatic disease to the cord or less likely 





 consideration such as hemangioblastoma. 2. Note is made of a hemangioma 





 involving the T5 and T10 vertebral bodies.     Ruddy Cheney MD 


 


Brain MRI 2/21/18 0808 Signed





Impressions: 





 Service Date/Time:  Wednesday, February 21, 2018 13:15 - CONCLUSION:  Negative 





 MRI of the brain with and without contrast.     Dav Juan MD 


 


Thoracic Spine MRI 2/21/18 0000 Signed





Impressions: 





 Service Date/Time:  Wednesday, February 21, 2018 18:23 - CONCLUSION:  1. The 





 examination demonstrates an intramedullary, intradural lesion immediately 





 posterior to the T9-10 disc level. Differential considerations are given 

above.  





 2. The dedicated imaging of the thoracic spine also demonstrates abnormal 





 enhancement and abnormal signal within the posterior elements of the T10 and 

T11 





 vertebral bodies concerning for bony metastatic disease.     Ruddy Cheney MD 


 


Head CTA 2/21/18 0000 Signed





Impressions: 





 Service Date/Time:  Thursday, February 22, 2018 05:38 - CONCLUSION:  1. 

Aneurysm 





 clip in the suprasellar region with streak artifact. 2. No new aneurysm. 3. 

Mild 





 atherosclerotic changes.     Ambrosio Mclean MD 


 


Head CT 2/21/18 0000 Signed





Impressions: 





 Service Date/Time:  Wednesday, February 21, 2018 06:46 - CONCLUSION: Stable 





 appearance with no evidence of hemorrhage or mass effect.     Ambrosio Mclean MD 


 


Chest X-Ray 2/21/18 0000 Signed





Impressions: 





 Service Date/Time:  Wednesday, February 21, 2018 08:42 - CONCLUSION:  Small 





 right infrahilar infiltrate.     Dav Juan MD 


 


Chest CT 2/21/18 0000 Signed





Impressions: 





 Service Date/Time:  Thursday, February 22, 2018 05:38 - CONCLUSION:  1. No 





 evidence of metastatic disease or primary tumor. 2. Multiple benign cystic 





 appearing structures in the liver. 3. Left adrenal adenoma.     Ambrosio Mclean MD 


 


Abdomen/Pelvis CT 2/21/18 0000 Signed





Impressions: 





 Service Date/Time:  Thursday, February 22, 2018 05:38 - CONCLUSION:  1. No 





 primary tumor or evidence of metastatic disease. 2. Left adrenal adenoma 3. 





 Multiple benign-appearing cystic lesions in the liver.     Ambrosio Mclean MD 








PE at Discharge


General: NAD, Awake and alert


Chest: CTA


Cardiac: Regular


Abd: +BS, soft ND/NT


Ext: Left foot active ROM, right foot great toe trace movement, decrease 

sensation in bilateral LE (more in LLE than right)


Hospital Course


(1) Leg weakness, bilateral


ICD Codes:  R29.898 - Other symptoms and signs involving the musculoskeletal 

system


Status:  Acute


Plan:  


T9-T10, intradural lesion 


Progressive LE weakness/paralysis


Hyperreflexive LE


Bladder incontinence


Tobacco use


   - Pt is a 63 y/o WF with HTN, GERD, OA and hx of ruptured cerebral aneurysm 

in 2008 s/p coiling procedure. 


   - [place] [facility] [place] in bilateral LE. 

See HPI for details of sequence of events


   - In January 2017 she restarted the Lyrica 150mg po BID and this was 

increased to TID dosing but over the last month


    and a half she has had worsening of her bilateral LE weakness. She is 

unable to bear weight and over the last two weeks


    she has not been able to move her LE actively and her bladder incontinence 

has been much worse. 


   - Head CT (2/21) --> Stable appearance with no evidence of hemorrhage or 

mass effect. 


   - ESR is 27, CRP is less than 0.29


   - CXR (2/21/18) -->  Small right infrahilar infiltrate.


   - MRI Brain (2/21/18) --> Negative MRI of the brain with and without 

contrast.


   - MRI Spine screening (2/21/18) --> The examination demonstrates a 1.6 x 1.1 

x 1.2 cm homogeneously enhancing mass which 


    appears to be an intradural intramedullary lesion. Considerations would 

include ependymoma, astrocytoma, metastatic disease 


    to the cord or less likely consideration such as hemangioblastoma. Note is 

made of a hemangioma involving the T5 and T10 vertebral bodies.  


   - Neurosurgery was consulted based on the MRI spine results. 


   - Pt was started on IV Decadron


   - CT Chest/Abd/Pelvis (2/21/18) --> No evidence of metastatic disease or 

primary tumor. Multiple benign cystic appearing structures in the


    liver. Left adrenal adenoma.  


   - MRI Thoracic spine (2/22/18) -->  The examination demonstrates an 

intramedullary, intradural lesion immediately posterior to the T9-10 disc 

level. 


    The dedicated imaging of the thoracic spine also demonstrates abnormal 

enhancement and abnormal signal within the posterior elements of the T10 


    and T11 vertebral bodies concerning for bony metastatic disease. 


   - On 2/23. pt underwent thoracic T9-11 laminectomy with resection of 

intradural extramedullary neoplasm with Dr. Zimmerman


   - Pathology --> Meningioma


   - Of note, pt has never had a colonoscopy or cystoscopy. She does have 

family hx of colon cancer (Mat. GM and Mat uncle)


   - Case d/w Neurosurgery service, Rao Em PA-C (2/28).  Neurosurgery okay 

with discharge to [facility]. 


   - will wean dexamethasone over 9d


   - pt to f/u with PCP, Dr. Anderson, 1 week after discharge from [facility]





Diabetes Mellitus


   - steroid hyperglycemia


            - Hgb A1C 7.9%


   - NovoLog SSI .


   - levemir 15units BID, this will need to be closely followed at [facility]


   - I anticipate that pt's levemir will be decreased as pt is weaned off of 

dexamethasone. 





HTN


   - Resumed home meds


   - Monitor elevation with prn coverage.(pt on steroids/ivf)





GERD


   - PPI





Hx of ruptured intracranial aneurysm in 2008 s/p coiling


   - Stable


   - CTA Head (2/21/18) --> Aneurysm clip in the suprasellar region with streak 

artifact. No new aneurysm. Mild atherosclerotic changes.   





(2) Bladder incontinence


ICD Codes:  R32 - Unspecified urinary incontinence


(3) Tobacco abuse


ICD Codes:  Z72.0 - Tobacco use


(4) HTN (hypertension)


ICD Codes:  I10 - Essential (primary) hypertension


(5) GERD (gastroesophageal reflux disease)


ICD Codes:  K21.9 - Gastro-esophageal reflux disease without esophagitis


Pt Condition on Discharge:  Stable


Discharge Disposition:  Rehab Inpatient


Discharge Instructions


DIET: Follow Instructions for:  Heart Healthy Diet, Diabetic Diet


Activities you can perform:  Weight Bearing as Nacho


Follow up Referrals:  


Neurosurgery - 2 Weeks with Varun Zimmerman MD


PCP Follow-up F/U WITH PCP, DR. MANINDER ANDERSON, 2 WEEKS AFTER DISCHARGE FROM 

[facility].





New Medications:  


Cyclobenzaprine (Flexeril) 10 Mg Tab


10 MG PO Q8H PRN for MUSCLE SPASM for 7 Days, #21 TAB





Dexamethasone (Dexamethasone) 4 Mg Tab


4 MG PO TID for inflammation for 7 Days, TAB


4mg BID x 3d, then


 4mg daily x 3d, then


 2mg daily x 3d, then stop


Enoxaparin Inj (Lovenox Inj) 30 Mg/0.3 Ml Syr


30 MG SQ Q12H for anticoagulation for 10 Days, INJECTION





Insulin Aspart Inj (Novolog Inj) 100 Unit/Ml Inj


1 UNIT SQ ACHS SLIDING SCALE for dm for 10 Days, INJECTION





Insulin Detemir Inj (Levemir Inj) 1,000 unit/ 10 ML Vial


15 UNITS SQ Q12HR for dm for 10 Days, INJECTION


Do not mix with any other Insulin.


Nicotine (Eq Nicotine) 14 Mg/24 Hour Dis


1 PATCH T-DERMAL DAILY for tobacco dependence for 7 Days, #7 PATCH





Tramadol (Ultram) 50 Mg Tab


50 MG PO Q6H PRN for pain 1-10 for 10 Days, #40 TAB





[Lactulose Liq] () 30 ML SYRP


30 ML PO DAILY PRN for SEVERE CONSITIPATION





[Remove Old Patch] ()  MISC


1 T-DERMAL DAILY for nicotine dependence for 10 Days





 


Continued Medications:  


Amlodipine (Norvasc) 10 Mg Tab


10 MG PO DAILY for Blood Pressure Management, #30 TAB 0 Refills





Calcium Carbonate-Cholecalciferol (Calcium 600 with Vitamin D) 600-400 mg-Unit 

Tab


1 TAB PO DAILY for Calcium Supplement, TAB 0 Refills





Cetirizine (Cetirizine) 10 Mg Tab


10 MG DAILY for Allergies, TAB 0 Refills





Docusate Sodium (Colace) 100 Mg Capsule


100 MG PO BID for Prevent Constipation, #60 CAP 0 Refills





Duloxetine DR (Cymbalta DR) 60 Mg Capdr


60 MG PO DAILY, #30 CAP 0 Refills





Fish Oil-Cholecalciferol (Omega-3 Fish Oil/Vitamin) 1,000-1,000 Mg Cap


1 CAP PO DAILY for Nutritional Supplement, CAP 0 Refills





Lisinopril (Lisinopril) 40 Mg Tab


40 MG PO DAILY for Blood Pressure Management, #30 TAB 0 Refills





Magnesium Oxide (Magnesium Oxide) 400 Mg Tab


400 MG PO BID for Nutritional Supplement, TAB 0 Refills





Omeprazole (Omeprazole) 40 Mg Cap


40 MG DAILY, #30 CAP 0 Refills





 


Discontinued Medications:  


Ascorbic Acid (Vitamin C) 250 Mg Chew


500 MG CHEW DAILY for Nutritional Supplement, #30 TAB 0 Refills





Aspirin (Aspirin) 81 Mg Chew


81 MG CHEW DAILY, TAB 0 Refills





Cyanocobalamin (B12) 1,000 Mcg Tab








Glucosamine (Glucosamine) 1,000 Mg Cap


1000 MG DAILY for Herbal Supplements, CAP 0 Refills





Multiple Vitamin (Multi-Vitamin Daily) 1 Tab Tab


1 TAB PO DAILY for Nutritional Supplement, TAB 0 Refills





Naproxen (Naprosyn) 500 Mg Tab


500 MG PO Q12HR PRN for PAIN SCALE 1 TO 10, #20 TAB 0 Refills





Pregabalin (Lyrica) 150 Mg Cap


150 MG PO TID, #90 CAP 0 Refills

















Mj Hyatt DO Feb 28, 2018 14:40
